# Patient Record
Sex: MALE | Race: WHITE | NOT HISPANIC OR LATINO | Employment: OTHER | URBAN - NONMETROPOLITAN AREA
[De-identification: names, ages, dates, MRNs, and addresses within clinical notes are randomized per-mention and may not be internally consistent; named-entity substitution may affect disease eponyms.]

---

## 2024-07-31 ENCOUNTER — APPOINTMENT (OUTPATIENT)
Dept: PRIMARY CARE | Facility: CLINIC | Age: 79
End: 2024-07-31
Payer: MEDICARE

## 2024-07-31 VITALS
WEIGHT: 273 LBS | HEART RATE: 60 BPM | BODY MASS INDEX: 33.94 KG/M2 | HEIGHT: 75 IN | SYSTOLIC BLOOD PRESSURE: 112 MMHG | DIASTOLIC BLOOD PRESSURE: 73 MMHG

## 2024-07-31 DIAGNOSIS — I89.0 LYMPHEDEMA: ICD-10-CM

## 2024-07-31 DIAGNOSIS — G62.9 NEUROPATHY: ICD-10-CM

## 2024-07-31 DIAGNOSIS — I10 PRIMARY HYPERTENSION: ICD-10-CM

## 2024-07-31 DIAGNOSIS — E07.9 DISEASE OF THYROID GLAND: ICD-10-CM

## 2024-07-31 DIAGNOSIS — Z12.5 SCREENING FOR PROSTATE CANCER: ICD-10-CM

## 2024-07-31 DIAGNOSIS — I48.21 PERMANENT ATRIAL FIBRILLATION (MULTI): ICD-10-CM

## 2024-07-31 DIAGNOSIS — M06.0A RHEUMATOID ARTHRITIS OF OTHER SITE WITH NEGATIVE RHEUMATOID FACTOR (MULTI): ICD-10-CM

## 2024-07-31 DIAGNOSIS — E89.0 POSTOPERATIVE HYPOTHYROIDISM: ICD-10-CM

## 2024-07-31 DIAGNOSIS — K40.90 NON-RECURRENT UNILATERAL INGUINAL HERNIA WITHOUT OBSTRUCTION OR GANGRENE: Primary | ICD-10-CM

## 2024-07-31 PROCEDURE — 1158F ADVNC CARE PLAN TLK DOCD: CPT | Performed by: INTERNAL MEDICINE

## 2024-07-31 PROCEDURE — 99204 OFFICE O/P NEW MOD 45 MIN: CPT | Performed by: INTERNAL MEDICINE

## 2024-07-31 PROCEDURE — 1036F TOBACCO NON-USER: CPT | Performed by: INTERNAL MEDICINE

## 2024-07-31 PROCEDURE — 1159F MED LIST DOCD IN RCRD: CPT | Performed by: INTERNAL MEDICINE

## 2024-07-31 PROCEDURE — 3078F DIAST BP <80 MM HG: CPT | Performed by: INTERNAL MEDICINE

## 2024-07-31 PROCEDURE — 1160F RVW MEDS BY RX/DR IN RCRD: CPT | Performed by: INTERNAL MEDICINE

## 2024-07-31 PROCEDURE — 1123F ACP DISCUSS/DSCN MKR DOCD: CPT | Performed by: INTERNAL MEDICINE

## 2024-07-31 PROCEDURE — 3074F SYST BP LT 130 MM HG: CPT | Performed by: INTERNAL MEDICINE

## 2024-07-31 RX ORDER — LOSARTAN POTASSIUM 25 MG/1
1 TABLET ORAL DAILY
COMMUNITY
End: 2024-07-31 | Stop reason: SDUPTHER

## 2024-07-31 RX ORDER — DEXTROMETHORPHAN HYDROBROMIDE, GUAIFENESIN 5; 100 MG/5ML; MG/5ML
650 LIQUID ORAL EVERY 8 HOURS PRN
COMMUNITY

## 2024-07-31 RX ORDER — METOPROLOL SUCCINATE 25 MG/1
25 TABLET, EXTENDED RELEASE ORAL DAILY
COMMUNITY

## 2024-07-31 RX ORDER — AMIODARONE HYDROCHLORIDE 200 MG/1
1 TABLET ORAL DAILY
COMMUNITY

## 2024-07-31 RX ORDER — GABAPENTIN 100 MG/1
100 CAPSULE ORAL 3 TIMES DAILY
Qty: 90 CAPSULE | Refills: 3 | Status: SHIPPED | OUTPATIENT
Start: 2024-07-31

## 2024-07-31 RX ORDER — ACETAMINOPHEN 650 MG/1
SUPPOSITORY RECTAL
COMMUNITY

## 2024-07-31 RX ORDER — LEVOTHYROXINE SODIUM 75 UG/1
1 TABLET ORAL DAILY
COMMUNITY
End: 2024-07-31 | Stop reason: SDUPTHER

## 2024-07-31 RX ORDER — CAYENNE 450 MG
CAPSULE ORAL
COMMUNITY

## 2024-07-31 RX ORDER — NAPROXEN SODIUM 220 MG
220 TABLET ORAL
COMMUNITY

## 2024-07-31 RX ORDER — LOSARTAN POTASSIUM 25 MG/1
25 TABLET ORAL DAILY
Qty: 90 TABLET | Refills: 3 | Status: SHIPPED | OUTPATIENT
Start: 2024-07-31

## 2024-07-31 RX ORDER — FUROSEMIDE 20 MG/1
1 TABLET ORAL DAILY
COMMUNITY

## 2024-07-31 RX ORDER — GABAPENTIN 100 MG/1
1 CAPSULE ORAL 3 TIMES DAILY
COMMUNITY
End: 2024-07-31 | Stop reason: SDUPTHER

## 2024-07-31 RX ORDER — LEVOTHYROXINE SODIUM 75 UG/1
75 TABLET ORAL DAILY
Qty: 90 TABLET | Refills: 3 | Status: SHIPPED | OUTPATIENT
Start: 2024-07-31

## 2024-07-31 RX ORDER — RIVAROXABAN 20 MG/1
1 TABLET, FILM COATED ORAL DAILY
COMMUNITY

## 2024-07-31 ASSESSMENT — ENCOUNTER SYMPTOMS
WHEEZING: 0
SHORTNESS OF BREATH: 0
SINUS PRESSURE: 0
BACK PAIN: 0
APPETITE CHANGE: 0
ARTHRALGIAS: 1
ACTIVITY CHANGE: 0
WEAKNESS: 0
NUMBNESS: 0
DIARRHEA: 0
VOMITING: 0
FATIGUE: 0
ABDOMINAL DISTENTION: 0
CHILLS: 0
NAUSEA: 0
COUGH: 0
SINUS PAIN: 0
SORE THROAT: 0

## 2024-07-31 ASSESSMENT — PATIENT HEALTH QUESTIONNAIRE - PHQ9
SUM OF ALL RESPONSES TO PHQ9 QUESTIONS 1 AND 2: 0
1. LITTLE INTEREST OR PLEASURE IN DOING THINGS: NOT AT ALL
2. FEELING DOWN, DEPRESSED OR HOPELESS: NOT AT ALL

## 2024-07-31 NOTE — PROGRESS NOTES
"Subjective   Patient ID: Avelino Luna is a 78 y.o. male who presents for Establish Care (NP/EST CARE).  HPI  Patient is 78 y.o. male patient who is here today to establish care.     Pt has a pmhx of a fib,  hypothyroidism, RA affecting joints on right side of his body, OA. Multiple relatives with AAA aneurysms.     Review of Systems   Constitutional:  Negative for activity change, appetite change, chills and fatigue.   HENT:  Negative for congestion, postnasal drip, sinus pressure, sinus pain and sore throat.    Respiratory:  Negative for cough, shortness of breath and wheezing.    Cardiovascular:  Negative for chest pain and leg swelling.   Gastrointestinal:  Negative for abdominal distention, diarrhea, nausea and vomiting.   Musculoskeletal:  Positive for arthralgias. Negative for back pain.   Neurological:  Negative for weakness and numbness.       Objective   /73   Pulse 60   Ht 1.905 m (6' 3\")   Wt 124 kg (273 lb)   BMI 34.12 kg/m²     Physical Exam  Constitutional:       General: He is not in acute distress.     Appearance: Normal appearance.   HENT:      Head: Normocephalic.      Right Ear: Tympanic membrane, ear canal and external ear normal.      Left Ear: Tympanic membrane, ear canal and external ear normal.      Nose: Nose normal.      Mouth/Throat:      Pharynx: No oropharyngeal exudate.   Eyes:      General:         Right eye: No discharge.         Left eye: No discharge.      Extraocular Movements: Extraocular movements intact.      Pupils: Pupils are equal, round, and reactive to light.   Cardiovascular:      Rate and Rhythm: Normal rate and regular rhythm.      Heart sounds: No murmur heard.     No gallop.   Pulmonary:      Effort: Pulmonary effort is normal. No respiratory distress.      Breath sounds: Normal breath sounds. No wheezing.   Abdominal:      General: Bowel sounds are normal. There is no distension.      Palpations: Abdomen is soft.      Tenderness: There is no abdominal " tenderness.      Hernia: A hernia (left inguinal) is present.   Musculoskeletal:         General: No swelling. Normal range of motion.      Cervical back: Neck supple. No tenderness.   Skin:     General: Skin is warm and dry.      Coloration: Skin is not jaundiced.   Neurological:      General: No focal deficit present.      Mental Status: He is alert and oriented to person, place, and time.      Cranial Nerves: No cranial nerve deficit.   Psychiatric:         Mood and Affect: Mood normal.         Behavior: Behavior normal.           Assessment/Plan   Problem List Items Addressed This Visit       A-fib (Multi)    Relevant Medications    metoprolol succinate XL (Toprol-XL) 25 mg 24 hr tablet    Hypertension    Relevant Medications    losartan (Cozaar) 25 mg tablet    Other Relevant Orders    Lipid Panel    Comprehensive Metabolic Panel    Hemoglobin A1C    Disease of thyroid gland    RA (rheumatoid arthritis) (Multi)    Lymphedema     Other Visit Diagnoses       Non-recurrent unilateral inguinal hernia without obstruction or gangrene    -  Primary    Relevant Orders    Referral to General Surgery    Postoperative hypothyroidism        Relevant Medications    levothyroxine (Synthroid, Levoxyl) 75 mcg tablet    Neuropathy        Relevant Medications    gabapentin (Neurontin) 100 mg capsule    Screening for prostate cancer        Relevant Orders    Prostate Spec.Ag,Screen        Immunizations   Flu shot 2023  COVID received   PNA received   Shingles recommende   RSV recommended     Colon cancer screening 2016, normal   PSA will order     A fib, HTN , lymphedema   - continue amiodarone 200mg po daily   - continue lasix 20mg po dialy   -continue losartan 25mg po daily   - continue metoprolol 25mg po daily   - uses lymphedema pump nightly     2. Hypothyroidism   - check tsh   - continue synthroid     3. Hx of dvt   - was about 11 mo after knee replacement   - on xarelto     4. 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     5. Will obtain previous records   Final diagnoses:   [K40.90] Non-recurrent unilateral inguinal hernia without obstruction or gangrene   [E89.0] Postoperative hypothyroidism   [I10] Primary hypertension   [G62.9] Neuropathy   [Z12.5] Screening for prostate cancer   [I48.21] Permanent atrial fibrillation (Multi)   [E07.9] Disease of thyroid gland   [M06.0A] Rheumatoid arthritis of other site with negative rheumatoid factor (Multi)   [I89.0] Lymphedema

## 2024-08-01 ENCOUNTER — TELEPHONE (OUTPATIENT)
Dept: PRIMARY CARE | Facility: CLINIC | Age: 79
End: 2024-08-01
Payer: MEDICARE

## 2024-08-01 NOTE — TELEPHONE ENCOUNTER
Pt came in concerned that he has high blood sugar on his after visit summary. Pt said he has never had high sugar but his wife has. He asked if it could be taken off.

## 2024-08-06 ENCOUNTER — APPOINTMENT (OUTPATIENT)
Dept: SURGERY | Facility: CLINIC | Age: 79
End: 2024-08-06
Payer: MEDICARE

## 2024-08-08 ENCOUNTER — LAB (OUTPATIENT)
Dept: LAB | Facility: LAB | Age: 79
End: 2024-08-08
Payer: MEDICARE

## 2024-08-08 ENCOUNTER — OFFICE VISIT (OUTPATIENT)
Dept: SURGERY | Facility: CLINIC | Age: 79
End: 2024-08-08
Payer: MEDICARE

## 2024-08-08 VITALS
BODY MASS INDEX: 33.82 KG/M2 | SYSTOLIC BLOOD PRESSURE: 118 MMHG | HEIGHT: 75 IN | DIASTOLIC BLOOD PRESSURE: 76 MMHG | WEIGHT: 272 LBS | HEART RATE: 55 BPM

## 2024-08-08 DIAGNOSIS — R10.32 LEFT GROIN PAIN: ICD-10-CM

## 2024-08-08 DIAGNOSIS — R10.32 LEFT GROIN PAIN: Primary | ICD-10-CM

## 2024-08-08 DIAGNOSIS — K40.90 NON-RECURRENT UNILATERAL INGUINAL HERNIA WITHOUT OBSTRUCTION OR GANGRENE: ICD-10-CM

## 2024-08-08 LAB
ANION GAP SERPL CALC-SCNC: 11 MMOL/L (ref 10–20)
BUN SERPL-MCNC: 23 MG/DL (ref 6–23)
CALCIUM SERPL-MCNC: 9 MG/DL (ref 8.6–10.3)
CHLORIDE SERPL-SCNC: 105 MMOL/L (ref 98–107)
CO2 SERPL-SCNC: 27 MMOL/L (ref 21–32)
CREAT SERPL-MCNC: 1.49 MG/DL (ref 0.5–1.3)
EGFRCR SERPLBLD CKD-EPI 2021: 48 ML/MIN/1.73M*2
GLUCOSE SERPL-MCNC: 104 MG/DL (ref 74–99)
POTASSIUM SERPL-SCNC: 4.8 MMOL/L (ref 3.5–5.3)
SODIUM SERPL-SCNC: 138 MMOL/L (ref 136–145)

## 2024-08-08 PROCEDURE — 1036F TOBACCO NON-USER: CPT | Performed by: SURGERY

## 2024-08-08 PROCEDURE — 1123F ACP DISCUSS/DSCN MKR DOCD: CPT | Performed by: SURGERY

## 2024-08-08 PROCEDURE — 1159F MED LIST DOCD IN RCRD: CPT | Performed by: SURGERY

## 2024-08-08 PROCEDURE — 3078F DIAST BP <80 MM HG: CPT | Performed by: SURGERY

## 2024-08-08 PROCEDURE — 3074F SYST BP LT 130 MM HG: CPT | Performed by: SURGERY

## 2024-08-08 PROCEDURE — 99203 OFFICE O/P NEW LOW 30 MIN: CPT | Performed by: SURGERY

## 2024-08-08 PROCEDURE — 80048 BASIC METABOLIC PNL TOTAL CA: CPT

## 2024-08-08 PROCEDURE — 36415 COLL VENOUS BLD VENIPUNCTURE: CPT

## 2024-08-08 NOTE — PROGRESS NOTES
General Surgery Consultation    Patient: Avelino Luna  : 1945  MRN: [id number]  Date of Consultation: 24    Referring Primary Care Provider: Ladonna Zepeda DO    Chief Complaint: Left groin pain    History of Present Illness: Avelino Luna is a 78 y.o. old male seen at the request of Dr. Zepeda for evaluation left groin pain.  He recently moved to [place] from [place] about a month ago.  In the process of moving, while lifting he had a sharp pain in the left groin.  This intermittently continues to give him discomfort.  It gives him a sharp pain particularly when swinging his legs to get out of bed.  He denies any visible bulge in the left groin.  He denies any symptoms on the right.  He denies any discomfort if he coughs or sneezes.  However, he does have some increased pain in that left groin if he strains with a bowel movement.  His only previous abdominal surgery is a laparoscopic cholecystectomy.  He had a right orchiectomy as a teenager for severe staph infection. He takes Xarelto for history of a right lower extremity DVT that occurred several months following a right knee replacement.  He states that he has held this in the past for previous procedures including his cholecystectomy.  His BMI is 34.  He is not a smoker.  Of note, his father had colon cancer.  His last colonoscopy was in 2016.    Medical History:  Left groin pain  History of DVT, 2016  Atrial fibrillation  Hypertension  Aortic aneurysm  Hypothyroidism  Rheumatoid arthritis  Lymphedema  Cervical spondyloarthritis  Neuropathy  Plantar fasciitis  Obesity, BMI 34    Surgical History:  Cholecystectomy  Colonoscopy, 10/16/2016 in [facility]  Carpal tunnel release  Bilateral total knee arthroplasty  Cataract extraction    Home Medications:  Prior to Admission medications    Medication Sig Start Date End Date Taking? Authorizing Provider   acetaminophen (Tylenol 8 HOUR) 650 mg ER tablet Take 1 tablet (650 mg) by mouth  every 8 hours if needed for mild pain (1 - 3). Do not crush, chew, or split.    Historical Provider, MD   acetaminophen (Tylenol) 650 mg suppository Insert into the rectum.    Historical Provider, MD   amiodarone (Pacerone) 200 mg tablet Take 1 tablet (200 mg) by mouth once daily.    Historical Provider, MD   capsicum, cayenne, 450 mg capsule Take by mouth.    Historical Provider, MD   collagen/biotin/ascorbic acid (COLLAGEN 1500 PLUS C ORAL) Take by mouth.    Historical Provider, MD   furosemide (Lasix) 20 mg tablet Take 1 tablet (20 mg) by mouth once daily.    Historical Provider, MD   gabapentin (Neurontin) 100 mg capsule Take 1 capsule (100 mg) by mouth 3 times a day. 7/31/24   Ladonna Zepeda DO   levothyroxine (Synthroid, Levoxyl) 75 mcg tablet Take 1 tablet (75 mcg) by mouth once daily. 7/31/24   Ladonna Zepeda DO   losartan (Cozaar) 25 mg tablet Take 1 tablet (25 mg) by mouth once daily. 7/31/24   Ladonna Zepeda DO   metoprolol succinate XL (Toprol-XL) 25 mg 24 hr tablet Take 1 tablet (25 mg) by mouth once daily.    Historical Provider, MD   multivit-min/folic/vit K/lycop (ONE DAILY MEN'S 50 PLUS W-D3 ORAL) Take by mouth.    Historical Provider, MD   naproxen sodium (Aleve) 220 mg tablet Take 1 tablet (220 mg) by mouth 2 times daily (morning and late afternoon).    Historical Provider, MD   turm/ging/liza/yuc/kojo/bijal/hor (TUMERSAID ORAL) Take by mouth.    Historical Provider, MD   Xarelto 20 mg tablet Take 1 tablet (20 mg) by mouth once daily.    Historical Provider, MD     Allergies:  Aspirin, Mold, NSAIDs, Ragweed    Family History:   Mother with breast cancer.  Father with colon cancer, diagnosed at age 93.  Paternal grandfather with prostate cancer.    Social History:  Non-smoker.  Alcohol use.  No drug use.  .    ROS:  Constitutional: + History of skin cancer  Cardiovascular: + Irregular heartbeat  Respiratory: No cough or shortness of breath  Gastrointestinal: + Left groin pain,  "occasional constipation  Genitourinary: + Frequent urination, history of kidney stones  Musculoskeletal: + Neck pain, arthritis  Integumentary: no rashes  Neurological: no confusion  Endocrine: no heat or cold intolerance  Heme/Lymph: + Easy bruising, on Xarelto for history of DVT    Objective:  /76   Pulse 55   Ht 1.905 m (6' 3\")   Wt 123 kg (272 lb)   BMI 34.00 kg/m²     Physical Exam:  Constitutional: No acute distress, conversant, pleasant  Neurologic: alert and oriented  Psych: appropriate affect  Ears, Nose, Mouth and Throat: mucus membranes moist  Pulmonary: No labored breathing  Cardiovascular: Regular rate and rhythm  Abdomen: soft, non-distended, nontender, port site scars in the upper abdomen consistent with previous laparoscopic cholecystectomy, BMI 34, excess suprapubic fat bilaterally but no asymmetry between the left and right side/no visible bulge in the left groin, he is tender on palpation in the left groin but I do not definitively feel a hernia in this area and no impulse when coughing.  Genitourinary: Right orchiectomy, left testicle without palpable mass  Musculoskeletal: Moves all extremities, no edema  Skin: no jaundice    Labs/Imaging:   No pertinent labs or imaging available for review.    Assessment and Plan: Avelino Luna is a 78 y.o. old male with left groin pain.  I do not appreciate an obvious hernia on physical exam, although he does have excess fat in this area which can somewhat limit the examination.  I think it is possible that this is just a pulled muscle, but the location of his pain is consistent with a hernia.  Therefore, we will obtain a CT scan for further evaluation.  I will see him back to discuss the results of the CT scan.  At that time, if a hernia is present we can discuss operative repair versus an observational approach.  His history of a orchiectomy on the contralateral side would weigh into the discussion of risks.  If either no hernia is present, or he " does not want hernia repair, we can also discuss getting him up-to-date on screening colonoscopy.    Ladonna Lopez MD  8/8/2024

## 2024-08-08 NOTE — LETTER
2024     Ladonna Zepeda DO  [address]  [facility] [address]    Patient: Avelino Luna   YOB: 1945   Date of Visit: 2024       Dear Dr. Ladonna Zepeda DO:    Thank you for referring Avelino Luna to me for evaluation. Below are my notes for this consultation.  If you have questions, please do not hesitate to call me. I look forward to following your patient along with you.       Sincerely,     Ladonna Lopez MD      CC: No Recipients  ______________________________________________________________________________________    General Surgery Consultation    Patient: Avelino Luna  : 1945  MRN: [id number]  Date of Consultation: 24    Referring Primary Care Provider: Ladonna Zepeda DO    Chief Complaint: Left groin pain    History of Present Illness: Avelino Luna is a 78 y.o. old male seen at the request of Dr. Zepeda for evaluation left groin pain.  He recently moved to [place] from [place] about a month ago.  In the process of moving, while lifting he had a sharp pain in the left groin.  This intermittently continues to give him discomfort.  It gives him a sharp pain particularly when swinging his legs to get out of bed.  He denies any visible bulge in the left groin.  He denies any symptoms on the right.  He denies any discomfort if he coughs or sneezes.  However, he does have some increased pain in that left groin if he strains with a bowel movement.  His only previous abdominal surgery is a laparoscopic cholecystectomy.  He had a right orchiectomy as a teenager for severe staph infection. He takes Xarelto for history of a right lower extremity DVT that occurred several months following a right knee replacement.  He states that he has held this in the past for previous procedures including his cholecystectomy.  His BMI is 34.  He is not a smoker.  Of note, his father had colon cancer.  His last colonoscopy was in  2016.    Medical History:  Left groin pain  History of DVT, 2016  Atrial fibrillation  Hypertension  Aortic aneurysm  Hypothyroidism  Rheumatoid arthritis  Lymphedema  Cervical spondyloarthritis  Neuropathy  Plantar fasciitis  Obesity, BMI 34    Surgical History:  Cholecystectomy  Colonoscopy, 10/16/2016 in [facility]  Carpal tunnel release  Bilateral total knee arthroplasty  Cataract extraction    Home Medications:  Prior to Admission medications    Medication Sig Start Date End Date Taking? Authorizing Provider   acetaminophen (Tylenol 8 HOUR) 650 mg ER tablet Take 1 tablet (650 mg) by mouth every 8 hours if needed for mild pain (1 - 3). Do not crush, chew, or split.    Historical Provider, MD   acetaminophen (Tylenol) 650 mg suppository Insert into the rectum.    Historical Provider, MD   amiodarone (Pacerone) 200 mg tablet Take 1 tablet (200 mg) by mouth once daily.    Historical Provider, MD   capsicum, cayenne, 450 mg capsule Take by mouth.    Historical Provider, MD   collagen/biotin/ascorbic acid (COLLAGEN 1500 PLUS C ORAL) Take by mouth.    Historical Provider, MD   furosemide (Lasix) 20 mg tablet Take 1 tablet (20 mg) by mouth once daily.    Historical Provider, MD   gabapentin (Neurontin) 100 mg capsule Take 1 capsule (100 mg) by mouth 3 times a day. 7/31/24   Ladonna Zepeda DO   levothyroxine (Synthroid, Levoxyl) 75 mcg tablet Take 1 tablet (75 mcg) by mouth once daily. 7/31/24   Ladonna Zepeda DO   losartan (Cozaar) 25 mg tablet Take 1 tablet (25 mg) by mouth once daily. 7/31/24   Ladonna Zepeda DO   metoprolol succinate XL (Toprol-XL) 25 mg 24 hr tablet Take 1 tablet (25 mg) by mouth once daily.    Historical Provider, MD   multivit-min/folic/vit K/lycop (ONE DAILY MEN'S 50 PLUS W-D3 ORAL) Take by mouth.    Historical Provider, MD   naproxen sodium (Aleve) 220 mg tablet Take 1 tablet (220 mg) by mouth 2 times daily (morning and late afternoon).    Historical Provider, MD  "  turm/ging/liza/yuc/kojo/bijal/hor (TUMERSAID ORAL) Take by mouth.    Historical Provider, MD   Xarelto 20 mg tablet Take 1 tablet (20 mg) by mouth once daily.    Historical Provider, MD     Allergies:  Aspirin, Mold, NSAIDs, Ragweed    Family History:   Mother with breast cancer.  Father with colon cancer, diagnosed at age 93.  Paternal grandfather with prostate cancer.    Social History:  Non-smoker.  Alcohol use.  No drug use.  .    ROS:  Constitutional: + History of skin cancer  Cardiovascular: + Irregular heartbeat  Respiratory: No cough or shortness of breath  Gastrointestinal: + Left groin pain, occasional constipation  Genitourinary: + Frequent urination, history of kidney stones  Musculoskeletal: + Neck pain, arthritis  Integumentary: no rashes  Neurological: no confusion  Endocrine: no heat or cold intolerance  Heme/Lymph: + Easy bruising, on Xarelto for history of DVT    Objective:  /76   Pulse 55   Ht 1.905 m (6' 3\")   Wt 123 kg (272 lb)   BMI 34.00 kg/m²     Physical Exam:  Constitutional: No acute distress, conversant, pleasant  Neurologic: alert and oriented  Psych: appropriate affect  Ears, Nose, Mouth and Throat: mucus membranes moist  Pulmonary: No labored breathing  Cardiovascular: Regular rate and rhythm  Abdomen: soft, non-distended, nontender, port site scars in the upper abdomen consistent with previous laparoscopic cholecystectomy, BMI 34, excess suprapubic fat bilaterally but no asymmetry between the left and right side/no visible bulge in the left groin, he is tender on palpation in the left groin but I do not definitively feel a hernia in this area and no impulse when coughing.  Genitourinary: Right orchiectomy, left testicle without palpable mass  Musculoskeletal: Moves all extremities, no edema  Skin: no jaundice    Labs/Imaging:   No pertinent labs or imaging available for review.    Assessment and Plan: Avelino Luna is a 78 y.o. old male with left groin pain.  I do " not appreciate an obvious hernia on physical exam, although he does have excess fat in this area which can somewhat limit the examination.  I think it is possible that this is just a pulled muscle, but the location of his pain is consistent with a hernia.  Therefore, we will obtain a CT scan for further evaluation.  I will see him back to discuss the results of the CT scan.  At that time, if a hernia is present we can discuss operative repair versus an observational approach.  His history of a orchiectomy on the contralateral side would weigh into the discussion of risks.  If either no hernia is present, or he does not want hernia repair, we can also discuss getting him up-to-date on screening colonoscopy.    Ladonna Lopez MD  8/8/2024

## 2024-08-12 DIAGNOSIS — I48.21 PERMANENT ATRIAL FIBRILLATION (MULTI): Primary | ICD-10-CM

## 2024-08-12 RX ORDER — AMIODARONE HYDROCHLORIDE 200 MG/1
200 TABLET ORAL DAILY
Qty: 90 TABLET | Refills: 3 | Status: SHIPPED | OUTPATIENT
Start: 2024-08-12

## 2024-08-15 ENCOUNTER — HOSPITAL ENCOUNTER (OUTPATIENT)
Dept: RADIOLOGY | Facility: HOSPITAL | Age: 79
Discharge: HOME | End: 2024-08-15
Payer: MEDICARE

## 2024-08-15 DIAGNOSIS — R10.32 LEFT GROIN PAIN: ICD-10-CM

## 2024-08-15 PROCEDURE — 74177 CT ABD & PELVIS W/CONTRAST: CPT

## 2024-08-15 PROCEDURE — A9698 NON-RAD CONTRAST MATERIALNOC: HCPCS | Performed by: SURGERY

## 2024-08-15 PROCEDURE — 2550000001 HC RX 255 CONTRASTS: Performed by: SURGERY

## 2024-08-22 ENCOUNTER — APPOINTMENT (OUTPATIENT)
Dept: SURGERY | Facility: CLINIC | Age: 79
End: 2024-08-22
Payer: MEDICARE

## 2024-08-22 ENCOUNTER — PREP FOR PROCEDURE (OUTPATIENT)
Dept: SURGERY | Facility: HOSPITAL | Age: 79
End: 2024-08-22

## 2024-08-22 VITALS
HEART RATE: 55 BPM | HEIGHT: 75 IN | WEIGHT: 272 LBS | SYSTOLIC BLOOD PRESSURE: 124 MMHG | DIASTOLIC BLOOD PRESSURE: 76 MMHG | BODY MASS INDEX: 33.82 KG/M2

## 2024-08-22 DIAGNOSIS — R10.32 LEFT GROIN PAIN: Primary | ICD-10-CM

## 2024-08-22 DIAGNOSIS — Z12.11 COLON CANCER SCREENING: Primary | ICD-10-CM

## 2024-08-22 PROCEDURE — 99213 OFFICE O/P EST LOW 20 MIN: CPT | Performed by: SURGERY

## 2024-08-22 PROCEDURE — 1159F MED LIST DOCD IN RCRD: CPT | Performed by: SURGERY

## 2024-08-22 PROCEDURE — 3078F DIAST BP <80 MM HG: CPT | Performed by: SURGERY

## 2024-08-22 PROCEDURE — 3074F SYST BP LT 130 MM HG: CPT | Performed by: SURGERY

## 2024-08-22 PROCEDURE — 1123F ACP DISCUSS/DSCN MKR DOCD: CPT | Performed by: SURGERY

## 2024-08-22 PROCEDURE — 1036F TOBACCO NON-USER: CPT | Performed by: SURGERY

## 2024-08-22 RX ORDER — ONDANSETRON HYDROCHLORIDE 2 MG/ML
4 INJECTION, SOLUTION INTRAVENOUS ONCE AS NEEDED
OUTPATIENT
Start: 2024-08-22

## 2024-08-22 RX ORDER — SODIUM CHLORIDE, SODIUM LACTATE, POTASSIUM CHLORIDE, CALCIUM CHLORIDE 600; 310; 30; 20 MG/100ML; MG/100ML; MG/100ML; MG/100ML
20 INJECTION, SOLUTION INTRAVENOUS CONTINUOUS
OUTPATIENT
Start: 2024-08-22

## 2024-08-22 NOTE — PROGRESS NOTES
General Surgery Consultation    Patient: Avelino Luna  : 1945  MRN: [id number]  Date of Consultation: 24    Primary Care Provider: Ladonna Zepeda DO    Chief Complaint: Left groin pain     History of Present Illness: Avelino Luna is a 78 y.o. old male who I previously evaluated on 2024 for left groin pain.  He was not seeing a visible bulge in the groin.  Certain movements elicited a sharp pain in the left groin.  This began shortly after moving to [place] from [place] and lifting a lot of boxes and that process.  I did not appreciate an inguinal hernia on physical exam.  We obtained a CT scan and this did not show the presence of a hernia.  He continues to have pain in the groin.  He mostly notices it when stretching prior to golfing, and when getting out of a low-seated chair.  He is also due for screening colonoscopy, which is why he brought him back to the office rather than just relaying CT results over the phone.  His father had a history of colon cancer.  His last colonoscopy was in 2016.     Medical History:  Left groin pain  History of DVT, 2016  Atrial fibrillation  Hypertension  Aortic aneurysm  Hypothyroidism  Rheumatoid arthritis  Lymphedema  Cervical spondyloarthritis  Neuropathy  Plantar fasciitis  Obesity, BMI 34     Surgical History:  Cholecystectomy  Colonoscopy, 10/16/2016 in [facility]  Carpal tunnel release  Bilateral total knee arthroplasty  Cataract extraction      Home Medications:  Prior to Admission medications    Medication Sig Start Date End Date Taking? Authorizing Provider   acetaminophen (Tylenol 8 HOUR) 650 mg ER tablet Take 1 tablet (650 mg) by mouth every 8 hours if needed for mild pain (1 - 3). Do not crush, chew, or split.    Historical Provider, MD   acetaminophen (Tylenol) 650 mg suppository Insert into the rectum.    Historical Provider, MD   amiodarone (Pacerone) 200 mg tablet Take 1 tablet (200 mg) by mouth once daily. 24   Ladonna MUÑIZ  DO Katelyn   capsicum, cayenne, 450 mg capsule Take by mouth.    Historical Provider, MD   collagen/biotin/ascorbic acid (COLLAGEN 1500 PLUS C ORAL) Take by mouth.    Historical Provider, MD   furosemide (Lasix) 20 mg tablet Take 1 tablet (20 mg) by mouth once daily.    Historical Provider, MD   gabapentin (Neurontin) 100 mg capsule Take 1 capsule (100 mg) by mouth 3 times a day. 7/31/24   Ladonna Zepeda DO   levothyroxine (Synthroid, Levoxyl) 75 mcg tablet Take 1 tablet (75 mcg) by mouth once daily. 7/31/24   Ladonna Zepeda DO   losartan (Cozaar) 25 mg tablet Take 1 tablet (25 mg) by mouth once daily. 7/31/24   Ladonna Zepeda DO   metoprolol succinate XL (Toprol-XL) 25 mg 24 hr tablet Take 1 tablet (25 mg) by mouth once daily.    Historical Provider, MD   multivit-min/folic/vit K/lycop (ONE DAILY MEN'S 50 PLUS W-D3 ORAL) Take by mouth.    Historical Provider, MD   naproxen sodium (Aleve) 220 mg tablet Take 1 tablet (220 mg) by mouth 2 times daily (morning and late afternoon).    Historical Provider, MD   turm/ging/liza/yuc/kojo/bijal/hor (TUMERSAID ORAL) Take by mouth.    Historical Provider, MD   Xarelto 20 mg tablet Take 1 tablet (20 mg) by mouth once daily.    Historical Provider, MD     Allergies:  Aspirin, Mold, NSAIDs, Ragweed     Family History:   Mother with breast cancer.  Father with colon cancer, diagnosed at age 93.  Paternal grandfather with prostate cancer.     Social History:  Non-smoker.  Alcohol use.  No drug use.  .     ROS:  Constitutional: + History of skin cancer  Cardiovascular: + Irregular heartbeat  Respiratory: No cough or shortness of breath  Gastrointestinal: + Left groin pain, occasional constipation  Genitourinary: + Frequent urination, history of kidney stones  Musculoskeletal: + Neck pain, arthritis  Integumentary: no rashes  Neurological: no confusion  Endocrine: no heat or cold intolerance  Heme/Lymph: + Easy bruising, on Xarelto for history of  "DVT    Objective:  /76   Pulse 55   Ht 1.905 m (6' 3\")   Wt 123 kg (272 lb)   BMI 34.00 kg/m²     Physical Exam:  Constitutional: No acute distress, conversant, pleasant  Neurologic: alert and oriented  Psych: appropriate affect  Ears, Nose, Mouth and Throat: mucus membranes moist  Pulmonary: No labored breathing  Cardiovascular: Regular rate and rhythm  Abdomen: Nondistended, BMI 34, nontender at epigastric hernia site  Musculoskeletal: Moves all extremities, no edema  Skin: no jaundice     Labs/Imaging:   CT abdomen and pelvis from 8/15/24 reviewed: Epigastric hernia measuring 2.4 cm.  No inguinal hernia or groin mass.     Assessment and Plan: Avelino Luna is a 78 y.o. old male with left groin pain.  I did not appreciate a hernia on physical exam, and CT scan did not show the presence of a hernia either.  It is possible that this was just musculoskeletal pain in the groin that he developed in the moving process while doing activity that he does not normally do.  If anything changes, I would be happy to reevaluate.  His last colonoscopy was in 2016.  His father had colon cancer.  We therefore discussed getting him up-to-date on screening colonoscopy as he is establishing care in the area.  We discussed the risks of this procedure.  This included risks of bleeding, perforation, missed polyps, incomplete colonoscopy, and potential need for additional procedures pending findings.  He was agreeable to proceed.  Bowel prep instructions were reviewed and all questions were answered.  He is scheduled for colonoscopy on 9/9/24.    Ladonna Lopez MD  8/22/2024    "

## 2024-08-22 NOTE — H&P (VIEW-ONLY)
General Surgery Consultation    Patient: Avelino Luna  : 1945  MRN: [id number]  Date of Consultation: 24    Primary Care Provider: Ladonna Zepeda DO    Chief Complaint: Left groin pain     History of Present Illness: Avelino Luna is a 78 y.o. old male who I previously evaluated on 2024 for left groin pain.  He was not seeing a visible bulge in the groin.  Certain movements elicited a sharp pain in the left groin.  This began shortly after moving to [place] from [place] and lifting a lot of boxes and that process.  I did not appreciate an inguinal hernia on physical exam.  We obtained a CT scan and this did not show the presence of a hernia.  He continues to have pain in the groin.  He mostly notices it when stretching prior to golfing, and when getting out of a low-seated chair.  He is also due for screening colonoscopy, which is why he brought him back to the office rather than just relaying CT results over the phone.  His father had a history of colon cancer.  His last colonoscopy was in 2016.     Medical History:  Left groin pain  History of DVT, 2016  Atrial fibrillation  Hypertension  Aortic aneurysm  Hypothyroidism  Rheumatoid arthritis  Lymphedema  Cervical spondyloarthritis  Neuropathy  Plantar fasciitis  Obesity, BMI 34     Surgical History:  Cholecystectomy  Colonoscopy, 10/16/2016 in [facility]  Carpal tunnel release  Bilateral total knee arthroplasty  Cataract extraction      Home Medications:  Prior to Admission medications    Medication Sig Start Date End Date Taking? Authorizing Provider   acetaminophen (Tylenol 8 HOUR) 650 mg ER tablet Take 1 tablet (650 mg) by mouth every 8 hours if needed for mild pain (1 - 3). Do not crush, chew, or split.    Historical Provider, MD   acetaminophen (Tylenol) 650 mg suppository Insert into the rectum.    Historical Provider, MD   amiodarone (Pacerone) 200 mg tablet Take 1 tablet (200 mg) by mouth once daily. 24   Ladonna MUÑIZ  DO Katelyn   capsicum, cayenne, 450 mg capsule Take by mouth.    Historical Provider, MD   collagen/biotin/ascorbic acid (COLLAGEN 1500 PLUS C ORAL) Take by mouth.    Historical Provider, MD   furosemide (Lasix) 20 mg tablet Take 1 tablet (20 mg) by mouth once daily.    Historical Provider, MD   gabapentin (Neurontin) 100 mg capsule Take 1 capsule (100 mg) by mouth 3 times a day. 7/31/24   Ladonna Zepeda DO   levothyroxine (Synthroid, Levoxyl) 75 mcg tablet Take 1 tablet (75 mcg) by mouth once daily. 7/31/24   Ladonna Zepeda DO   losartan (Cozaar) 25 mg tablet Take 1 tablet (25 mg) by mouth once daily. 7/31/24   Ladonna Zepeda DO   metoprolol succinate XL (Toprol-XL) 25 mg 24 hr tablet Take 1 tablet (25 mg) by mouth once daily.    Historical Provider, MD   multivit-min/folic/vit K/lycop (ONE DAILY MEN'S 50 PLUS W-D3 ORAL) Take by mouth.    Historical Provider, MD   naproxen sodium (Aleve) 220 mg tablet Take 1 tablet (220 mg) by mouth 2 times daily (morning and late afternoon).    Historical Provider, MD   turm/ging/liza/yuc/kojo/bijal/hor (TUMERSAID ORAL) Take by mouth.    Historical Provider, MD   Xarelto 20 mg tablet Take 1 tablet (20 mg) by mouth once daily.    Historical Provider, MD     Allergies:  Aspirin, Mold, NSAIDs, Ragweed     Family History:   Mother with breast cancer.  Father with colon cancer, diagnosed at age 93.  Paternal grandfather with prostate cancer.     Social History:  Non-smoker.  Alcohol use.  No drug use.  .     ROS:  Constitutional: + History of skin cancer  Cardiovascular: + Irregular heartbeat  Respiratory: No cough or shortness of breath  Gastrointestinal: + Left groin pain, occasional constipation  Genitourinary: + Frequent urination, history of kidney stones  Musculoskeletal: + Neck pain, arthritis  Integumentary: no rashes  Neurological: no confusion  Endocrine: no heat or cold intolerance  Heme/Lymph: + Easy bruising, on Xarelto for history of  "DVT    Objective:  /76   Pulse 55   Ht 1.905 m (6' 3\")   Wt 123 kg (272 lb)   BMI 34.00 kg/m²     Physical Exam:  Constitutional: No acute distress, conversant, pleasant  Neurologic: alert and oriented  Psych: appropriate affect  Ears, Nose, Mouth and Throat: mucus membranes moist  Pulmonary: No labored breathing  Cardiovascular: Regular rate and rhythm  Abdomen: Nondistended, BMI 34, nontender at epigastric hernia site  Musculoskeletal: Moves all extremities, no edema  Skin: no jaundice     Labs/Imaging:   CT abdomen and pelvis from 8/15/24 reviewed: Epigastric hernia measuring 2.4 cm.  No inguinal hernia or groin mass.     Assessment and Plan: Avelino Luna is a 78 y.o. old male with left groin pain.  I did not appreciate a hernia on physical exam, and CT scan did not show the presence of a hernia either.  It is possible that this was just musculoskeletal pain in the groin that he developed in the moving process while doing activity that he does not normally do.  If anything changes, I would be happy to reevaluate.  His last colonoscopy was in 2016.  His father had colon cancer.  We therefore discussed getting him up-to-date on screening colonoscopy as he is establishing care in the area.  We discussed the risks of this procedure.  This included risks of bleeding, perforation, missed polyps, incomplete colonoscopy, and potential need for additional procedures pending findings.  He was agreeable to proceed.  Bowel prep instructions were reviewed and all questions were answered.  He is scheduled for colonoscopy on 9/9/24.    Ladonna Lopez MD  8/22/2024    "

## 2024-08-23 ENCOUNTER — DOCUMENTATION (OUTPATIENT)
Dept: SURGERY | Facility: CLINIC | Age: 79
End: 2024-08-23
Payer: MEDICARE

## 2024-08-23 NOTE — PROGRESS NOTES
Notified patient of Colonoscopy       scheduled on 9-9-24  .Instructions reviewed. Advised patient [facility] will contact them 24 hours before surgery with arrival time. Pt voices understanding. Uzma De La Fuente MA.

## 2024-08-26 DIAGNOSIS — I10 PRIMARY HYPERTENSION: Primary | ICD-10-CM

## 2024-08-26 RX ORDER — FUROSEMIDE 20 MG/1
20 TABLET ORAL DAILY
Qty: 90 TABLET | Refills: 3 | Status: SHIPPED | OUTPATIENT
Start: 2024-08-26

## 2024-09-03 DIAGNOSIS — I48.21 PERMANENT ATRIAL FIBRILLATION (MULTI): Primary | ICD-10-CM

## 2024-09-03 DIAGNOSIS — I10 PRIMARY HYPERTENSION: ICD-10-CM

## 2024-09-03 RX ORDER — FUROSEMIDE 20 MG/1
20 TABLET ORAL DAILY
Qty: 90 TABLET | Refills: 3 | Status: SHIPPED | OUTPATIENT
Start: 2024-09-03 | End: 2024-09-05 | Stop reason: SDUPTHER

## 2024-09-05 DIAGNOSIS — I10 PRIMARY HYPERTENSION: ICD-10-CM

## 2024-09-05 DIAGNOSIS — I48.21 PERMANENT ATRIAL FIBRILLATION (MULTI): ICD-10-CM

## 2024-09-05 RX ORDER — FUROSEMIDE 20 MG/1
20 TABLET ORAL DAILY
Qty: 90 TABLET | Refills: 3 | Status: SHIPPED | OUTPATIENT
Start: 2024-09-05

## 2024-09-09 ENCOUNTER — HOSPITAL ENCOUNTER (OUTPATIENT)
Dept: GASTROENTEROLOGY | Facility: CLINIC | Age: 79
Setting detail: OUTPATIENT SURGERY
Discharge: HOME | End: 2024-09-09
Payer: MEDICARE

## 2024-09-09 VITALS
OXYGEN SATURATION: 99 % | BODY MASS INDEX: 33.35 KG/M2 | DIASTOLIC BLOOD PRESSURE: 68 MMHG | TEMPERATURE: 97.3 F | HEART RATE: 58 BPM | WEIGHT: 268.2 LBS | HEIGHT: 75 IN | RESPIRATION RATE: 16 BRPM | SYSTOLIC BLOOD PRESSURE: 114 MMHG

## 2024-09-09 DIAGNOSIS — Z12.11 COLON CANCER SCREENING: ICD-10-CM

## 2024-09-09 PROCEDURE — 2500000004 HC RX 250 GENERAL PHARMACY W/ HCPCS (ALT 636 FOR OP/ED): Performed by: SURGERY

## 2024-09-09 PROCEDURE — 45378 DIAGNOSTIC COLONOSCOPY: CPT | Performed by: SURGERY

## 2024-09-09 PROCEDURE — 7100000010 HC PHASE TWO TIME - EACH INCREMENTAL 1 MINUTE

## 2024-09-09 PROCEDURE — 3700000012 HC SEDATION LEVEL 5+ TIME - INITIAL 15 MINUTES 5/> YEARS

## 2024-09-09 PROCEDURE — G0500 MOD SEDAT ENDO SERVICE >5YRS: HCPCS | Performed by: SURGERY

## 2024-09-09 PROCEDURE — G0105 COLORECTAL SCRN; HI RISK IND: HCPCS | Performed by: SURGERY

## 2024-09-09 PROCEDURE — 99153 MOD SED SAME PHYS/QHP EA: CPT | Performed by: SURGERY

## 2024-09-09 PROCEDURE — 3700000013 HC SEDATION LEVEL 5+ TIME - EACH ADDITIONAL 15 MINUTES

## 2024-09-09 PROCEDURE — 7100000009 HC PHASE TWO TIME - INITIAL BASE CHARGE

## 2024-09-09 RX ORDER — FENTANYL CITRATE 50 UG/ML
INJECTION, SOLUTION INTRAMUSCULAR; INTRAVENOUS AS NEEDED
Status: COMPLETED | OUTPATIENT
Start: 2024-09-09 | End: 2024-09-09

## 2024-09-09 RX ORDER — SODIUM CHLORIDE, SODIUM LACTATE, POTASSIUM CHLORIDE, CALCIUM CHLORIDE 600; 310; 30; 20 MG/100ML; MG/100ML; MG/100ML; MG/100ML
20 INJECTION, SOLUTION INTRAVENOUS CONTINUOUS
Status: DISCONTINUED | OUTPATIENT
Start: 2024-09-09 | End: 2024-09-10 | Stop reason: HOSPADM

## 2024-09-09 RX ORDER — MIDAZOLAM HYDROCHLORIDE 5 MG/ML
INJECTION, SOLUTION INTRAMUSCULAR; INTRAVENOUS AS NEEDED
Status: COMPLETED | OUTPATIENT
Start: 2024-09-09 | End: 2024-09-09

## 2024-09-09 ASSESSMENT — PAIN - FUNCTIONAL ASSESSMENT
PAIN_FUNCTIONAL_ASSESSMENT: 0-10

## 2024-09-09 ASSESSMENT — PAIN SCALES - GENERAL
PAINLEVEL_OUTOF10: 0 - NO PAIN
PAINLEVEL_OUTOF10: 4
PAINLEVEL_OUTOF10: 0 - NO PAIN

## 2024-09-10 ENCOUNTER — TELEPHONE (OUTPATIENT)
Dept: SURGERY | Facility: CLINIC | Age: 79
End: 2024-09-10
Payer: MEDICARE

## 2024-09-10 NOTE — TELEPHONE ENCOUNTER
Patient returned call. He is doing great after colonoscopy. I did advise him he needs to have repeat in 5 yrs. /ac    Left message to see how patient was doing after colonscopy. Provided office phone number.Pt needs to repeat in 5 years.

## 2024-11-04 ENCOUNTER — OFFICE VISIT (OUTPATIENT)
Dept: URGENT CARE | Facility: CLINIC | Age: 79
End: 2024-11-04
Payer: MEDICARE

## 2024-11-04 VITALS
RESPIRATION RATE: 14 BRPM | WEIGHT: 270 LBS | BODY MASS INDEX: 35.78 KG/M2 | TEMPERATURE: 97.7 F | HEIGHT: 73 IN | SYSTOLIC BLOOD PRESSURE: 134 MMHG | DIASTOLIC BLOOD PRESSURE: 84 MMHG | OXYGEN SATURATION: 97 % | HEART RATE: 77 BPM

## 2024-11-04 DIAGNOSIS — J20.9 ACUTE BRONCHITIS, UNSPECIFIED ORGANISM: Primary | ICD-10-CM

## 2024-11-04 PROCEDURE — 99213 OFFICE O/P EST LOW 20 MIN: CPT | Performed by: NURSE PRACTITIONER

## 2024-11-04 RX ORDER — ALBUTEROL SULFATE 90 UG/1
2 INHALANT RESPIRATORY (INHALATION) EVERY 6 HOURS PRN
Qty: 18 G | Refills: 0 | Status: SHIPPED | OUTPATIENT
Start: 2024-11-04 | End: 2025-11-04

## 2024-11-04 RX ORDER — DOXYCYCLINE 100 MG/1
100 TABLET ORAL 2 TIMES DAILY
Qty: 14 TABLET | Refills: 0 | Status: SHIPPED | OUTPATIENT
Start: 2024-11-04 | End: 2024-11-11

## 2024-11-04 RX ORDER — PREDNISONE 10 MG/1
30 TABLET ORAL DAILY
Qty: 15 TABLET | Refills: 0 | Status: SHIPPED | OUTPATIENT
Start: 2024-11-04 | End: 2024-11-09

## 2024-11-04 NOTE — PROGRESS NOTES
79 y.o. male presents for evaluation of URI.  Symptoms including cough, congestion, body aches, malaise, and headache have been present for 4 days and refractory to OTC meds.  No fever, chills, nausea, vomiting, abdominal pain, CP, or SOB.  Wife ill with same symptoms. No known COVID 19/flu exposure.      Vitals:    11/04/24 1316   BP: 134/84   Pulse: 77   Resp: 14   Temp: 36.5 °C (97.7 °F)   SpO2: 97%     Allergies   Allergen Reactions    Aspirin Unknown    Mold Unknown    Nsaids (Non-Steroidal Anti-Inflammatory Drug) Unknown    Ragweed Unknown       Medication Documentation Review Audit       Reviewed by Farhana Del Toro MA (Medical Assistant) on 11/04/24 at 1315      Medication Order Taking? Sig Documenting Provider Last Dose Status   acetaminophen (Tylenol 8 HOUR) 650 mg ER tablet [id number] Yes Take 1 tablet (650 mg) by mouth every 8 hours if needed for mild pain (1 - 3). Do not crush, chew, or split. Historical Provider, MD Past Week Active   amiodarone (Pacerone) 200 mg tablet [id number] Yes Take 1 tablet (200 mg) by mouth once daily. Ladonna Zepeda DO Past Week Active   capsicum, cayenne, 450 mg capsule [id number] Yes Take by mouth. Historical Provider, MD Past Week Active   collagen/biotin/ascorbic acid (COLLAGEN 1500 PLUS C ORAL) [id number] Yes Take by mouth. Historical Provider, MD Past Week Active   furosemide (Lasix) 20 mg tablet [id number] Yes Take 1 tablet (20 mg) by mouth once daily. Ladonna Zepeda DO Past Week Active   gabapentin (Neurontin) 100 mg capsule [id number] Yes Take 1 capsule (100 mg) by mouth 3 times a day. Ladonna Zepeda DO Past Week Active   levothyroxine (Synthroid, Levoxyl) 75 mcg tablet [id number] Yes Take 1 tablet (75 mcg) by mouth once daily. Ladonna Zepeda DO Past Week Active   losartan (Cozaar) 25 mg tablet [id number] Yes Take 1 tablet (25 mg) by mouth once daily. Ladonna Zepeda DO Past Week Active   metoprolol succinate XL (Toprol-XL) 25 mg 24 hr tablet [id number]  Yes Take 1 tablet (25 mg) by mouth once daily. Historical Provider, MD Past Week Active   multivit-min/folic/vit K/lycop (ONE DAILY MEN'S 50 PLUS W-D3 ORAL) [id number] Yes Take by mouth. Historical Provider, MD Past Week Active   naproxen sodium (Aleve) 220 mg tablet [id number] Yes Take 1 tablet (220 mg) by mouth 2 times daily (morning and late afternoon). Historical Provider, MD Past Week Active   rivaroxaban (Xarelto) 20 mg tablet [id number] Yes Take 1 tablet (20 mg) by mouth once daily. Ladonna Zepeda DO Past Week Active   turm/ging/liza/yuc/kojo/bijal/hor (TUMERSAID ORAL) [id number] Yes Take by mouth. Historical Provider, MD Past Week Active                    Past Medical History:   Diagnosis Date    A-fib (Multi)     Aortic aneurysm (CMS-HCC)     Cervical spondylarthritis     Disease of thyroid gland     DVT (deep venous thrombosis) (Multi)     Hypertension     Lymphedema     Osteoarthritis     Plantar fasciitis, bilateral     RA (rheumatoid arthritis)        Past Surgical History:   Procedure Laterality Date    CARPAL TUNNEL RELEASE Bilateral     CATARACT EXTRACTION Right     CHOLECYSTECTOMY      COLONOSCOPY  2016    COLONOSCOPY  09/09/2024    Repeat 5 years  fam  hx of colon CA       ORCHIECTOMY      SQUAMOUS CELL CARCINOMA EXCISION      on scalp    TOTAL KNEE ARTHROPLASTY Bilateral        ROS  See HPI    Physical Exam  Vitals and nursing note reviewed.   Constitutional:       Appearance: He is ill-appearing (mildly).   HENT:      Head: Normocephalic and atraumatic.      Right Ear: Tympanic membrane, ear canal and external ear normal.      Left Ear: Tympanic membrane, ear canal and external ear normal.      Nose: Congestion present.      Mouth/Throat:      Mouth: Mucous membranes are moist.      Pharynx: Oropharynx is clear.   Eyes:      Extraocular Movements: Extraocular movements intact.      Conjunctiva/sclera: Conjunctivae normal.      Pupils: Pupils are equal, round, and reactive to light.    Cardiovascular:      Rate and Rhythm: Normal rate.   Pulmonary:      Effort: Pulmonary effort is normal. No tachypnea, accessory muscle usage or respiratory distress.      Breath sounds: Decreased air movement present. Wheezing and rhonchi present. No rales.   Skin:     General: Skin is warm.   Neurological:      General: No focal deficit present.      Mental Status: He is alert and oriented to person, place, and time.   Psychiatric:         Mood and Affect: Mood normal.         Behavior: Behavior normal.           Assessment/Plan/MDM  Avelino was seen today for uri.  Diagnoses and all orders for this visit:  Acute bronchitis, unspecified organism (Primary)  -     doxycycline (Adoxa) 100 mg tablet; Take 1 tablet (100 mg) by mouth 2 times a day for 7 days. Take with a full glass of water and do not lie down for at least 30 minutes after  -     predniSONE (Deltasone) 10 mg tablet; Take 3 tablets (30 mg) by mouth once daily for 5 days.  -     albuterol 90 mcg/actuation inhaler; Inhale 2 puffs every 6 hours if needed for wheezing.    Encouraged pt to use otc cold remedies PRN, push PO fluids and rest. Patient's clinical presentation is otherwise unremarkable at this time. Patient is discharged with instructions to follow-up with primary care or seek emergency medical attention for worsening symptoms or any new concerns.    I did personally review Avelino's past medical history, surgical history, social history, as well as family history (when relevant).  In this case, I also oversaw the his drug management by reviewing his medication list, allergy list, as well as the medications that I prescribed during the UC course and/or recommended as an out-patient (including possible OTC medications such as acetaminophen, NSAIDs , etc).    After reviewing the items above, I did look at previous medical documentation, such as recent hospitalizations, office visits, and/or recent consultations with PCP/specialist.                           SDOH:   Another factor that I considered in Avelino's care was his Social Determinants of Health (SDOH). During this UC encounter, he did not have social determinants of health. Those SDOH influencing Avelino's care are: none      Gen Schafer CNP  [facility]  [phone]

## 2024-11-05 ENCOUNTER — APPOINTMENT (OUTPATIENT)
Dept: PRIMARY CARE | Facility: CLINIC | Age: 79
End: 2024-11-05
Payer: MEDICARE

## 2025-01-08 ENCOUNTER — OFFICE VISIT (OUTPATIENT)
Dept: URGENT CARE | Facility: CLINIC | Age: 80
End: 2025-01-08
Payer: MEDICARE

## 2025-01-08 VITALS
RESPIRATION RATE: 18 BRPM | OXYGEN SATURATION: 97 % | HEART RATE: 66 BPM | SYSTOLIC BLOOD PRESSURE: 139 MMHG | TEMPERATURE: 98.4 F | DIASTOLIC BLOOD PRESSURE: 78 MMHG

## 2025-01-08 DIAGNOSIS — L03.116 CELLULITIS OF LEFT ANTERIOR LOWER LEG: Primary | ICD-10-CM

## 2025-01-08 PROCEDURE — 99213 OFFICE O/P EST LOW 20 MIN: CPT | Performed by: NURSE PRACTITIONER

## 2025-01-08 RX ORDER — MUPIROCIN 20 MG/G
OINTMENT TOPICAL
Qty: 15 G | Refills: 0 | Status: SHIPPED | OUTPATIENT
Start: 2025-01-08 | End: 2025-01-15

## 2025-01-08 RX ORDER — DOXYCYCLINE 100 MG/1
100 TABLET ORAL 2 TIMES DAILY
Qty: 14 TABLET | Refills: 0 | Status: SHIPPED | OUTPATIENT
Start: 2025-01-08 | End: 2025-01-15

## 2025-01-08 NOTE — PROGRESS NOTES
79 y.o. male presents for evaluation of left lower leg redness and swelling to anterior aspect of leg that has been present for several weeks. States it started out as an abrasion and it will improve and then worsen again. Denies fevers, drainage from area of concern. Does wear support stockings. No other complaints.    Vitals:    01/08/25 1307   BP: 139/78   Pulse: 66   Resp: 18   Temp: 36.9 °C (98.4 °F)   SpO2: 97%       Allergies   Allergen Reactions    Aspirin Unknown    Mold Unknown    Nsaids (Non-Steroidal Anti-Inflammatory Drug) Unknown    Ragweed Unknown       Medication Documentation Review Audit       Reviewed by Ramana Graves MA (Medical Assistant) on 01/08/25 at 1307      Medication Order Taking? Sig Documenting Provider Last Dose Status   acetaminophen (Tylenol 8 HOUR) 650 mg ER tablet [id number] Yes Take 1 tablet (650 mg) by mouth every 8 hours if needed for mild pain (1 - 3). Do not crush, chew, or split. Historical Provider, MD Past Week Active   albuterol 90 mcg/actuation inhaler [id number] Yes Inhale 2 puffs every 6 hours if needed for wheezing. Gen Schafer, APRN-CNP  Active   amiodarone (Pacerone) 200 mg tablet [id number] Yes Take 1 tablet (200 mg) by mouth once daily. Ladonna Zepeda DO Past Week Active   capsicum, cayenne, 450 mg capsule [id number] Yes Take by mouth. Historical Provider, MD Past Week Active   collagen/biotin/ascorbic acid (COLLAGEN 1500 PLUS C ORAL) [id number] Yes Take by mouth. Historical Provider, MD Past Week Active   furosemide (Lasix) 20 mg tablet [id number] Yes Take 1 tablet (20 mg) by mouth once daily. Ladonna Zepeda DO Past Week Active   gabapentin (Neurontin) 100 mg capsule [id number] Yes Take 1 capsule (100 mg) by mouth 3 times a day. Ladonna Zepeda DO Past Week Active   levothyroxine (Synthroid, Levoxyl) 75 mcg tablet [id number] Yes Take 1 tablet (75 mcg) by mouth once daily. Ladonna Zepeda DO Past Week Active   losartan (Cozaar) 25 mg tablet  [id number] Yes Take 1 tablet (25 mg) by mouth once daily. Ladonna Zepeda DO Past Week Active   metoprolol succinate XL (Toprol-XL) 25 mg 24 hr tablet [id number] Yes Take 1 tablet (25 mg) by mouth once daily. Historical Provider, MD Past Week Active   multivit-min/folic/vit K/lycop (ONE DAILY MEN'S 50 PLUS W-D3 ORAL) [id number] Yes Take by mouth. Historical Provider, MD Past Week Active   naproxen sodium (Aleve) 220 mg tablet [id number] Yes Take 1 tablet (220 mg) by mouth 2 times daily (morning and late afternoon). Historical Provider, MD Past Week Active   rivaroxaban (Xarelto) 20 mg tablet [id number] Yes Take 1 tablet (20 mg) by mouth once daily. Ladonna Zepeda DO Past Week Active   turm/ging/liza/yuc/kojo/bijal/hor (TUMERSAID ORAL) [id number] Yes Take by mouth. Historical Provider, MD Past Week Active                    Past Medical History:   Diagnosis Date    A-fib (Multi)     Aortic aneurysm (CMS-HCC)     Cervical spondylarthritis     Disease of thyroid gland     DVT (deep venous thrombosis) (Multi)     Hypertension     Lymphedema     Osteoarthritis     Plantar fasciitis, bilateral     RA (rheumatoid arthritis)        Past Surgical History:   Procedure Laterality Date    CARPAL TUNNEL RELEASE Bilateral     CATARACT EXTRACTION Right     CHOLECYSTECTOMY      COLONOSCOPY  2016    COLONOSCOPY  09/09/2024    Repeat 5 years  fam  hx of colon CA       ORCHIECTOMY      SQUAMOUS CELL CARCINOMA EXCISION      on scalp    TOTAL KNEE ARTHROPLASTY Bilateral        ROS  See HPI    Physical Exam  Vitals and nursing note reviewed.   Constitutional:       Appearance: Normal appearance.   Skin:     General: Skin is warm and dry.      Findings: Erythema (to anterior left lower leg with 3, 2 cm scabbed over abrasion with surrounding erythema and mild edema, no streaking or drainage) present.      Comments: Vascular discoloration to bilateral lower legs   Neurological:      General: No focal deficit present.      Mental  Status: He is alert and oriented to person, place, and time.   Psychiatric:         Mood and Affect: Mood normal.         Behavior: Behavior normal.           Assessment/Plan/MDM  Avelino was seen today for leg swelling.  Diagnoses and all orders for this visit:  Cellulitis of left anterior lower leg (Primary)  -     doxycycline (Adoxa) 100 mg tablet; Take 1 tablet (100 mg) by mouth 2 times a day for 7 days. Take with a full glass of water and do not lie down for at least 30 minutes after  -     mupirocin (Bactroban) 2 % ointment; Apply topically 3 times a day for 7 days.    Discussed wound care with patient.  Patient's clinical presentation is otherwise unremarkable at this time. Patient is discharged with instructions to follow-up with primary care or seek emergency medical attention for worsening symptoms or any new concerns.    I did personally review Avelino's past medical history, surgical history, social history, as well as family history (when relevant).  In this case, I also oversaw the his drug management by reviewing his medication list, allergy list, as well as the medications that I prescribed during the UC course and/or recommended as an out-patient (including possible OTC medications such as acetaminophen, NSAIDs , etc).    After reviewing the items above, I did look at previous medical documentation, such as recent hospitalizations, office visits, and/or recent consultations with PCP/specialist.                          SDOH:   Another factor that I considered in Avelino's care was his Social Determinants of Health (SDOH). During this UC encounter, he did not have social determinants of health. Those SDOH influencing Avelino's care are: none      Gen Schafer CNP  [facility]  [phone]

## 2025-01-21 DIAGNOSIS — Z12.5 ENCOUNTER FOR SCREENING FOR MALIGNANT NEOPLASM OF PROSTATE: ICD-10-CM

## 2025-01-21 DIAGNOSIS — E03.9 HYPOTHYROIDISM, UNSPECIFIED: ICD-10-CM

## 2025-01-21 DIAGNOSIS — E78.2 MIXED HYPERLIPIDEMIA: ICD-10-CM

## 2025-01-21 DIAGNOSIS — I10 ESSENTIAL (PRIMARY) HYPERTENSION: Primary | ICD-10-CM

## 2025-06-09 ENCOUNTER — ANTICOAGULATION - WARFARIN VISIT (OUTPATIENT)
Dept: PHARMACY | Facility: HOSPITAL | Age: 80
End: 2025-06-09
Payer: MEDICARE

## 2025-06-09 DIAGNOSIS — I48.19 PERSISTENT ATRIAL FIBRILLATION (MULTI): Primary | ICD-10-CM

## 2025-06-09 RX ORDER — WARFARIN SODIUM 5 MG/1
TABLET ORAL
Qty: 30 TABLET | Refills: 0 | OUTPATIENT
Start: 2025-06-09 | End: 2026-02-04

## 2025-06-09 NOTE — PROGRESS NOTES
Pt enrolled in [facility] for management of Persistent atrial fibrillation (Multi) [I48.19].     Current anticoagulant: Rivaroxaban    Pt is currently on Xarelto for a fib/stroke prevention. However, he reports a $400+/3 month copay. He states that this is too much. I did review the Medicare cap of $2000/year for all medications.    He has about 18 doses of Xarelto left    Plan:  Patient was instructed to continue with Xarelto for now. He is going to do the math on his copays and max out of pocket and see if he will reach that soon.  I will send out warfarin 5mg tablet to [facility] to start on 6/23, overlapping with Xarelto for the last 2 days. He will then have just 2 days of warfarin.  INR will occur in 3 weeks, or 4 days after starting warfarin.  He is to call us if he stays on Xarelto  Patient was instructed to maintain consistent vegetable intake, to monitor for any bruising or bleeding, and to call with any medication changes or concerns.    Pt handout given with above information    Gatito Jauregui, PharmD  P:[phone]  F:[phone]

## 2025-06-17 ENCOUNTER — APPOINTMENT (OUTPATIENT)
Dept: RADIOLOGY | Facility: HOSPITAL | Age: 80
End: 2025-06-17
Payer: MEDICARE

## 2025-06-17 ENCOUNTER — APPOINTMENT (OUTPATIENT)
Dept: CARDIOLOGY | Facility: HOSPITAL | Age: 80
End: 2025-06-17
Payer: MEDICARE

## 2025-06-17 ENCOUNTER — HOSPITAL ENCOUNTER (EMERGENCY)
Facility: HOSPITAL | Age: 80
Discharge: HOME | End: 2025-06-17
Attending: EMERGENCY MEDICINE
Payer: MEDICARE

## 2025-06-17 VITALS
HEIGHT: 75 IN | WEIGHT: 270 LBS | DIASTOLIC BLOOD PRESSURE: 82 MMHG | RESPIRATION RATE: 16 BRPM | HEART RATE: 44 BPM | SYSTOLIC BLOOD PRESSURE: 134 MMHG | TEMPERATURE: 97.9 F | OXYGEN SATURATION: 96 % | BODY MASS INDEX: 33.57 KG/M2

## 2025-06-17 DIAGNOSIS — R00.1 BRADYCARDIA: ICD-10-CM

## 2025-06-17 DIAGNOSIS — R42 VERTIGO: Primary | ICD-10-CM

## 2025-06-17 LAB
ALBUMIN SERPL BCP-MCNC: 4 G/DL (ref 3.4–5)
ALP SERPL-CCNC: 47 U/L (ref 33–136)
ALT SERPL W P-5'-P-CCNC: 10 U/L (ref 10–52)
ANION GAP SERPL CALC-SCNC: 12 MMOL/L (ref 10–20)
APPEARANCE UR: CLEAR
APTT PPP: 36 SECONDS (ref 26–36)
AST SERPL W P-5'-P-CCNC: 17 U/L (ref 9–39)
BASOPHILS # BLD AUTO: 0.02 X10*3/UL (ref 0–0.1)
BASOPHILS NFR BLD AUTO: 0.3 %
BILIRUB SERPL-MCNC: 0.8 MG/DL (ref 0–1.2)
BILIRUB UR STRIP.AUTO-MCNC: NEGATIVE MG/DL
BUN SERPL-MCNC: 20 MG/DL (ref 6–23)
CALCIUM SERPL-MCNC: 9.1 MG/DL (ref 8.6–10.3)
CARDIAC TROPONIN I PNL SERPL HS: 4 NG/L (ref 0–20)
CHLORIDE SERPL-SCNC: 108 MMOL/L (ref 98–107)
CO2 SERPL-SCNC: 24 MMOL/L (ref 21–32)
COLOR UR: NORMAL
CREAT SERPL-MCNC: 1.47 MG/DL (ref 0.5–1.3)
EGFRCR SERPLBLD CKD-EPI 2021: 49 ML/MIN/1.73M*2
EOSINOPHIL # BLD AUTO: 0.17 X10*3/UL (ref 0–0.4)
EOSINOPHIL NFR BLD AUTO: 2.8 %
ERYTHROCYTE [DISTWIDTH] IN BLOOD BY AUTOMATED COUNT: 13.4 % (ref 11.5–14.5)
GLUCOSE SERPL-MCNC: 101 MG/DL (ref 74–99)
GLUCOSE UR STRIP.AUTO-MCNC: NORMAL MG/DL
HCT VFR BLD AUTO: 39.1 % (ref 41–52)
HGB BLD-MCNC: 13.5 G/DL (ref 13.5–17.5)
IMM GRANULOCYTES # BLD AUTO: 0.01 X10*3/UL (ref 0–0.5)
IMM GRANULOCYTES NFR BLD AUTO: 0.2 % (ref 0–0.9)
INR PPP: 1.6 (ref 0.9–1.1)
KETONES UR STRIP.AUTO-MCNC: NEGATIVE MG/DL
LEUKOCYTE ESTERASE UR QL STRIP.AUTO: NEGATIVE
LYMPHOCYTES # BLD AUTO: 1.99 X10*3/UL (ref 0.8–3)
LYMPHOCYTES NFR BLD AUTO: 32.9 %
MAGNESIUM SERPL-MCNC: 2.08 MG/DL (ref 1.6–2.4)
MCH RBC QN AUTO: 34 PG (ref 26–34)
MCHC RBC AUTO-ENTMCNC: 34.5 G/DL (ref 32–36)
MCV RBC AUTO: 99 FL (ref 80–100)
MONOCYTES # BLD AUTO: 0.46 X10*3/UL (ref 0.05–0.8)
MONOCYTES NFR BLD AUTO: 7.6 %
NEUTROPHILS # BLD AUTO: 3.39 X10*3/UL (ref 1.6–5.5)
NEUTROPHILS NFR BLD AUTO: 56.2 %
NITRITE UR QL STRIP.AUTO: NEGATIVE
NRBC BLD-RTO: 0 /100 WBCS (ref 0–0)
PH UR STRIP.AUTO: 6.5 [PH]
PLATELET # BLD AUTO: 166 X10*3/UL (ref 150–450)
POTASSIUM SERPL-SCNC: 4.1 MMOL/L (ref 3.5–5.3)
PROT SERPL-MCNC: 6.8 G/DL (ref 6.4–8.2)
PROT UR STRIP.AUTO-MCNC: NEGATIVE MG/DL
PROTHROMBIN TIME: 18 SECONDS (ref 9.8–12.4)
RBC # BLD AUTO: 3.97 X10*6/UL (ref 4.5–5.9)
RBC # UR STRIP.AUTO: NEGATIVE MG/DL
SODIUM SERPL-SCNC: 140 MMOL/L (ref 136–145)
SP GR UR STRIP.AUTO: 1.01
UROBILINOGEN UR STRIP.AUTO-MCNC: NORMAL MG/DL
WBC # BLD AUTO: 6 X10*3/UL (ref 4.4–11.3)

## 2025-06-17 PROCEDURE — 36415 COLL VENOUS BLD VENIPUNCTURE: CPT | Performed by: EMERGENCY MEDICINE

## 2025-06-17 PROCEDURE — 96361 HYDRATE IV INFUSION ADD-ON: CPT

## 2025-06-17 PROCEDURE — 70450 CT HEAD/BRAIN W/O DYE: CPT | Performed by: RADIOLOGY

## 2025-06-17 PROCEDURE — 84484 ASSAY OF TROPONIN QUANT: CPT | Performed by: EMERGENCY MEDICINE

## 2025-06-17 PROCEDURE — 70450 CT HEAD/BRAIN W/O DYE: CPT

## 2025-06-17 PROCEDURE — 81003 URINALYSIS AUTO W/O SCOPE: CPT | Performed by: EMERGENCY MEDICINE

## 2025-06-17 PROCEDURE — 85730 THROMBOPLASTIN TIME PARTIAL: CPT | Performed by: EMERGENCY MEDICINE

## 2025-06-17 PROCEDURE — 71045 X-RAY EXAM CHEST 1 VIEW: CPT | Performed by: RADIOLOGY

## 2025-06-17 PROCEDURE — 71045 X-RAY EXAM CHEST 1 VIEW: CPT

## 2025-06-17 PROCEDURE — 85025 COMPLETE CBC W/AUTO DIFF WBC: CPT | Performed by: EMERGENCY MEDICINE

## 2025-06-17 PROCEDURE — 99285 EMERGENCY DEPT VISIT HI MDM: CPT | Mod: 25 | Performed by: EMERGENCY MEDICINE

## 2025-06-17 PROCEDURE — 2500000004 HC RX 250 GENERAL PHARMACY W/ HCPCS (ALT 636 FOR OP/ED): Performed by: EMERGENCY MEDICINE

## 2025-06-17 PROCEDURE — 85610 PROTHROMBIN TIME: CPT | Performed by: EMERGENCY MEDICINE

## 2025-06-17 PROCEDURE — 96372 THER/PROPH/DIAG INJ SC/IM: CPT | Performed by: EMERGENCY MEDICINE

## 2025-06-17 PROCEDURE — 93005 ELECTROCARDIOGRAM TRACING: CPT

## 2025-06-17 PROCEDURE — 83735 ASSAY OF MAGNESIUM: CPT | Performed by: EMERGENCY MEDICINE

## 2025-06-17 PROCEDURE — 2500000002 HC RX 250 W HCPCS SELF ADMINISTERED DRUGS (ALT 637 FOR MEDICARE OP, ALT 636 FOR OP/ED): Performed by: EMERGENCY MEDICINE

## 2025-06-17 PROCEDURE — 96374 THER/PROPH/DIAG INJ IV PUSH: CPT

## 2025-06-17 PROCEDURE — 80053 COMPREHEN METABOLIC PANEL: CPT | Performed by: EMERGENCY MEDICINE

## 2025-06-17 RX ORDER — ATORVASTATIN CALCIUM 10 MG/1
10 TABLET, FILM COATED ORAL
COMMUNITY
Start: 2024-10-24

## 2025-06-17 RX ORDER — MECLIZINE HYDROCHLORIDE 25 MG/1
25 TABLET ORAL ONCE
Status: COMPLETED | OUTPATIENT
Start: 2025-06-17 | End: 2025-06-17

## 2025-06-17 RX ORDER — ONDANSETRON HYDROCHLORIDE 2 MG/ML
4 INJECTION, SOLUTION INTRAVENOUS ONCE
Status: COMPLETED | OUTPATIENT
Start: 2025-06-17 | End: 2025-06-17

## 2025-06-17 RX ORDER — DOCUSATE SODIUM 100 MG/1
100 CAPSULE, LIQUID FILLED ORAL
COMMUNITY

## 2025-06-17 RX ORDER — DIAZEPAM 5 MG/1
2.5 TABLET ORAL ONCE
Status: COMPLETED | OUTPATIENT
Start: 2025-06-17 | End: 2025-06-17

## 2025-06-17 RX ORDER — DIAZEPAM 5 MG/1
2.5 TABLET ORAL 2 TIMES DAILY PRN
Qty: 3 TABLET | Refills: 0 | Status: SHIPPED | OUTPATIENT
Start: 2025-06-17

## 2025-06-17 RX ORDER — MECLIZINE HYDROCHLORIDE 25 MG/1
25 TABLET ORAL 3 TIMES DAILY PRN
Qty: 30 TABLET | Refills: 0 | Status: SHIPPED | OUTPATIENT
Start: 2025-06-17 | End: 2025-06-27

## 2025-06-17 RX ADMIN — MECLIZINE HYDROCHLORIDE 25 MG: 25 TABLET ORAL at 14:06

## 2025-06-17 RX ADMIN — DIAZEPAM 2.5 MG: 5 TABLET ORAL at 15:48

## 2025-06-17 RX ADMIN — ONDANSETRON 4 MG: 2 INJECTION INTRAMUSCULAR; INTRAVENOUS at 16:16

## 2025-06-17 RX ADMIN — SODIUM CHLORIDE 1000 ML: 0.9 INJECTION, SOLUTION INTRAVENOUS at 14:05

## 2025-06-17 RX ADMIN — GLUCAGON 1 MG: 1 INJECTION, POWDER, LYOPHILIZED, FOR SOLUTION INTRAMUSCULAR; INTRAVENOUS at 16:17

## 2025-06-17 ASSESSMENT — PAIN SCALES - GENERAL: PAINLEVEL_OUTOF10: 0 - NO PAIN

## 2025-06-17 ASSESSMENT — PAIN - FUNCTIONAL ASSESSMENT: PAIN_FUNCTIONAL_ASSESSMENT: 0-10

## 2025-06-17 NOTE — ED PROVIDER NOTES
"HPI   Chief Complaint   Patient presents with    Dizziness     Pt states \"vertigo\" since yesterday morning. C/o dizzines and nausea.        Limitations to History: None    HPI: 79-year-old male presents with concern for vertigo.  Began yesterday morning.  Room spinning.  Began having vomiting last night.  Improved when he closes his eyes.  States that he did fall approximately 1 week ago but did not strike his head.  Denies any fever, chills, vision change, neck pain, chest pain, shortness of breath, urinary symptoms.    ------------------------------------------------------------------------------------------------------------------------------------------  Physical Exam:    VS: As documented in the triage note and EMR flowsheet from this visit were reviewed.    Appearance: Alert. cooperative,  in no acute distress.   Skin: Intact,  dry skin, no lesions, rash, petechiae or purpura.   Eyes: PERRLA, EOMs intact,  Conjunctiva pink with no redness or exudates.   HENT: Normocephalic, atraumatic. Nares patent. No intraoral lesions.   Neck: Supple, without meningismus. Trachea at midline. No lymphadenopathy.  Pulmonary: Clear bilaterally with good chest wall excursion. No rales, rhonchi or wheezing. No accessory muscle use or stridor.  Cardiac: Regular rate and rhythm, no rubs, murmurs, or gallops.   Abdomen: Abdomen is soft, nontender, and nondistended.  No palpable organomegaly.  No rebound or guarding.  No CVA tenderness. Nonsurgical abdomen.  Genitourinary: Exam deferred.  Musculoskeletal: Full range of motion.  Pulses full and equal. No cyanosis, clubbing, or edema.  Neurological:  Cranial nerves are grossly intact, grossly normal sensation, no weakness, no focal findings identified.  Psychiatric: Appropriate mood and affect.                Patient History   Medical History[1]  Surgical History[2]  Family History[3]  Social History[4]    Physical Exam   ED Triage Vitals [06/17/25 1311]   Temperature Heart Rate " Respirations BP   36.6 °C (97.9 °F) 50 16 127/75      Pulse Ox Temp Source Heart Rate Source Patient Position   96 % Temporal -- --      BP Location FiO2 (%)     -- --       Physical Exam      ED Course & MDM   Diagnoses as of 06/17/25 1713   Vertigo   Bradycardia                 No data recorded                                 Medical Decision Making  Labs Reviewed  CBC WITH AUTO DIFFERENTIAL - Abnormal     WBC                           6.0                    nRBC                          0.0                    RBC                           3.97 (*)               Hemoglobin                    13.5                   Hematocrit                    39.1 (*)               MCV                           99                     MCH                           34.0                   MCHC                          34.5                   RDW                           13.4                   Platelets                     166                    Neutrophils %                 56.2                   Immature Granulocytes %, Automated   0.2                    Lymphocytes %                 32.9                   Monocytes %                   7.6                    Eosinophils %                 2.8                    Basophils %                   0.3                    Neutrophils Absolute          3.39                   Immature Granulocytes Absolute, Au*   0.01                   Lymphocytes Absolute          1.99                   Monocytes Absolute            0.46                   Eosinophils Absolute          0.17                   Basophils Absolute            0.02                COMPREHENSIVE METABOLIC PANEL - Abnormal     Glucose                       101 (*)                Sodium                        140                    Potassium                     4.1                    Chloride                      108 (*)                Bicarbonate                   24                     Anion Gap                     12                      Urea Nitrogen                 20                     Creatinine                    1.47 (*)               eGFR                          49 (*)                 Calcium                       9.1                    Albumin                       4.0                    Alkaline Phosphatase          47                     Total Protein                 6.8                    AST                           17                     Bilirubin, Total              0.8                    ALT                           10                  PROTIME-INR - Abnormal     Protime                       18.0 (*)               INR                           1.6 (*)             MAGNESIUM - Normal     Magnesium                     2.08                TROPONIN I, HIGH SENSITIVITY - Normal     Troponin I, High Sensitivity   4                          Narrative: Less than 99th percentile of normal range cutoff-                  Female and children under 18 years old <14 ng/L; Male <21 ng/L: Negative                  Repeat testing should be performed if clinically indicated.                                     Female and children under 18 years old 14-50 ng/L; Male 21-50 ng/L:                  Consistent with possible cardiac damage and possible increased clinical                   risk. Serial measurements may help to assess extent of myocardial damage.                                     >50 ng/L: Consistent with cardiac damage, increased clinical risk and                  myocardial infarction. Serial measurements may help assess extent of                   myocardial damage.                                      NOTE: Children less than 1 year old may have higher baseline troponin                   levels and results should be interpreted in conjunction with the overall                   clinical context.                                     NOTE: Troponin I testing is performed using a different                   testing methodology at  [facility] than at other                   [facility]. Direct result comparisons should only                   be made within the same method.  APTT - Normal     aPTT                          36                         Narrative: The APTT is no longer used for monitoring Unfractionated Heparin Therapy. For monitoring Heparin Therapy, use the Heparin Assay.  URINALYSIS WITH REFLEX CULTURE AND MICROSCOPIC - Normal     Color, Urine                                         Appearance, Urine             Clear                  Specific Gravity, Urine       1.012                  pH, Urine                     6.5                    Protein, Urine                NEGATIVE                Glucose, Urine                Normal                 Blood, Urine                  NEGATIVE                Ketones, Urine                NEGATIVE                Bilirubin, Urine              NEGATIVE                Urobilinogen, Urine           Normal                 Nitrite, Urine                NEGATIVE                Leukocyte Esterase, Urine     NEGATIVE             URINALYSIS WITH REFLEX CULTURE AND MICROSCOPIC         Narrative: The following orders were created for panel order Urinalysis with Reflex Culture and Microscopic.                  Procedure                               Abnormality         Status                                     ---------                               -----------         ------                                     Urinalysis with Reflex C...[[id number]]  Normal              Final result                               Extra Urine Gray Tube[[id number]]                            In process                                                   Please view results for these tests on the individual orders.  EXTRA URINE GRAY TUBE  CT head wo IV contrast   Final Result    No acute intracranial abnormality.                MACRO:    none          Signed by: Genet Brooks 6/17/2025 2:40 PM    Dictation  workstation:   [id number]     XR chest 1 view   Final Result    No acute cardiopulmonary process is evident.          MACRO:    None          Signed by: Fabio Elizabeth 6/17/2025 2:14 PM    Dictation workstation:   [id number]     Medical Decision Making:    Patient appears well and nontoxic.  No focal neurologic deficit.  NIH of 0.  EKG shows sinus bradycardia without acute AV block or ischemia.  Lab work unremarkable.  CT brain negative.  Chest x-ray clear.  Patient initially treated with 1 L normal saline and meclizine.  Improved but continues to have vertiginous symptoms.  Patient treated with oral Valium.  Case discussed with patient's cardiologist on-call, Dr. Sher, who recommended stopping patient's beta-blocker given his bradycardia but does not feel this is the cause of his vertigo.  Patient treated with intravenous Zofran and intramuscular glucagon.  Heart rate remaining between 46 and 52.  Patient has chronic bradycardia.  Patient will be given oral meclizine as well as Valium for home.  Advised on keeping pulse at home twice a day and sending results to cardiologist.  Asked return for new or worsening symptoms.  Stable at time of discharge.    Differential Diagnoses Considered: Intracranial hemorrhage, CVA, BPPV, electrolyte abnormality, volume depletion, symptomatic bradycardia    Independent Interpretation of Studies:  I independently interpreted: CT brain shows no intracranial hemorrhage or mass.  Chest x-ray without pneumonia or pneumothorax.    Escalation of Care:  Appropriate for discharge and follow-up with primary care and cardiology.    Prescription Drug Consideration: Oral meclizine and Valium.          Procedure  ECG 12 lead    Performed by: Arsen Durham DO  Authorized by: Arsen Durham DO    ECG interpreted by ED Physician in the absence of a cardiologist: yes    Comments:      EKG interpreted by Dr. John Durham: Sinus bradycardia 53 bpm.  MN interval 112 ms.  QTc 429  ms.         [1]   Past Medical History:  Diagnosis Date    A-fib (Multi)     Aortic aneurysm     Cervical spondylarthritis     Disease of thyroid gland     DVT (deep venous thrombosis) (Multi)     Hypertension     Lymphedema     Osteoarthritis     Plantar fasciitis, bilateral     RA (rheumatoid arthritis)    [2]   Past Surgical History:  Procedure Laterality Date    CARPAL TUNNEL RELEASE Bilateral     CATARACT EXTRACTION Right     CHOLECYSTECTOMY      COLONOSCOPY  2016    COLONOSCOPY  09/09/2024    Repeat 5 years  fam  hx of colon CA       ORCHIECTOMY      SQUAMOUS CELL CARCINOMA EXCISION      on scalp    TOTAL KNEE ARTHROPLASTY Bilateral    [3]   Family History  Problem Relation Name Age of Onset    Breast cancer Mother      Other (bowel obstruction) Mother      Colon cancer Father      Prostate cancer Paternal Grandfather      Cancer Other      Aortic aneurysm Other     [4]   Social History  Tobacco Use    Smoking status: Never    Smokeless tobacco: Never   Vaping Use    Vaping status: Never Used   Substance Use Topics    Alcohol use: Yes    Drug use: Never        Arsen Whitney DO  06/17/25 [id number]

## 2025-06-18 LAB — HOLD SPECIMEN: NORMAL

## 2025-06-21 LAB
ATRIAL RATE: 53 BPM
P OFFSET: 189 MS
P ONSET: 153 MS
PR INTERVAL: 112 MS
Q ONSET: 209 MS
QRS COUNT: 8 BEATS
QRS DURATION: 84 MS
QT INTERVAL: 458 MS
QTC CALCULATION(BAZETT): 429 MS
QTC FREDERICIA: 439 MS
R AXIS: -31 DEGREES
T AXIS: 11 DEGREES
T OFFSET: 438 MS
VENTRICULAR RATE: 53 BPM

## 2025-06-27 ENCOUNTER — ANTICOAGULATION - WARFARIN VISIT (OUTPATIENT)
Dept: PHARMACY | Facility: HOSPITAL | Age: 80
End: 2025-06-27
Payer: MEDICARE

## 2025-06-27 DIAGNOSIS — I48.19 PERSISTENT ATRIAL FIBRILLATION (MULTI): Primary | ICD-10-CM

## 2025-06-27 LAB
POC INR: 1.3 (ref 0.9–1.1)
POC PROTHROMBIN TIME: ABNORMAL (ref 9.3–12.5)

## 2025-06-27 PROCEDURE — 99211 OFF/OP EST MAY X REQ PHY/QHP: CPT | Performed by: PHARMACIST

## 2025-06-27 PROCEDURE — 85610 PROTHROMBIN TIME: CPT | Mod: QW

## 2025-06-27 NOTE — PROGRESS NOTES
Pt enrolled in [facility] for management of Persistent atrial fibrillation (Multi) [I48.19].     Pt current location in clinic.     Current anticoagulant: Warfarin    Time since last visit: new patient    Last INR: Pt is new to warfarin, started on 5 mg daily 4 days ago.      Last Creatinine:   Lab Results   Component Value Date    CREATININE 1.47 (H) 06/17/2025     Last hemoglobin/hematocrit:  Lab Results   Component Value Date    HGB 13.5 06/17/2025     Lab Results   Component Value Date    HCT 39.1 (L) 06/17/2025       Current INR: 1.3 is SUBtherapeutic for goal range of 2.0-3.0 and is reflective of 20 mg in the previous week prior to visit.    Dosing confirmed with patient  Patient denies any missed doses.  Patient denies any medication changes, diet changes, or OTC/herbal supplement changes since last visit.  Patient denies any adverse reactions or barriers.  Patient denies any CP/SOB, fatigue, bleeding or bruising since last visit.   Patient denies any change in alcohol or tobacco use since last visit.   Patient denies any upcoming medical or dental procedures.    Plan:  Patient was instructed to increase dose.  INR follow up will occur in 4 days.  Patient was instructed to maintain consistent vegetable intake, to monitor for any bruising or bleeding, and to call with any medication changes or concerns.    Pt handout given with above information    Dilma Schafer, PharmD  P:[phone]  F:[phone]

## 2025-06-30 ENCOUNTER — ANTICOAGULATION - WARFARIN VISIT (OUTPATIENT)
Dept: PHARMACY | Facility: HOSPITAL | Age: 80
End: 2025-06-30
Payer: MEDICARE

## 2025-06-30 DIAGNOSIS — I48.19 PERSISTENT ATRIAL FIBRILLATION (MULTI): Primary | ICD-10-CM

## 2025-06-30 LAB
POC INR: 2.1 (ref 0.9–1.1)
POC PROTHROMBIN TIME: ABNORMAL (ref 9.3–12.5)

## 2025-06-30 PROCEDURE — 85610 PROTHROMBIN TIME: CPT | Mod: QW | Performed by: NURSE PRACTITIONER

## 2025-06-30 PROCEDURE — 99211 OFF/OP EST MAY X REQ PHY/QHP: CPT | Performed by: PHARMACIST

## 2025-06-30 NOTE — PROGRESS NOTES
Pt enrolled in [facility] for management of Persistent atrial fibrillation (Multi) [I48.19].     Pt current location in clinic.     Current anticoagulant: Warfarin    Time since last visit: 3 days    Last INR: 1.3 on warfarin 20 mg in the previous week. Pt was instructed to take 10 mg daily over the weekend at last visit.    Last Creatinine:   Lab Results   Component Value Date    CREATININE 1.47 (H) 06/17/2025     Last hemoglobin/hematocrit:  Lab Results   Component Value Date    HGB 13.5 06/17/2025     Lab Results   Component Value Date    HCT 39.1 (L) 06/17/2025       Current INR: 2.1 is therapeutic for goal range of 2.0-3.0 and is reflective of 50 mg in the previous week prior to visit with higher doses over the weekend to get him started on warfairn    Dosing confirmed with patient  Patient denies any missed doses.  Patient denies any medication changes, diet changes, or OTC/herbal supplement changes since last visit.  Patient denies any adverse reactions or barriers.  Patient denies any CP/SOB, fatigue, bleeding or bruising since last visit.   Patient denies any change in alcohol or tobacco use since last visit.   Patient denies any upcoming medical or dental procedures.    Plan:  Patient was instructed toresume 5mg daily.  INR follow up will occur in 1 weeks.  Patient was instructed to maintain consistent vegetable intake, to monitor for any bruising or bleeding, and to call with any medication changes or concerns.    Pt handout given with above information    Gatito Jauregui, PharmD  P:[phone]  F:[phone]

## 2025-07-07 ENCOUNTER — APPOINTMENT (OUTPATIENT)
Dept: PHARMACY | Facility: HOSPITAL | Age: 80
End: 2025-07-07
Payer: MEDICARE

## 2025-07-07 DIAGNOSIS — I48.19 PERSISTENT ATRIAL FIBRILLATION (MULTI): Primary | ICD-10-CM

## 2025-07-07 LAB
POC INR: 3.9 (ref 0.9–1.1)
POC PROTHROMBIN TIME: ABNORMAL (ref 9.3–12.5)

## 2025-07-07 PROCEDURE — 99211 OFF/OP EST MAY X REQ PHY/QHP: CPT | Performed by: PHARMACIST

## 2025-07-07 PROCEDURE — 85610 PROTHROMBIN TIME: CPT | Mod: QW | Performed by: NURSE PRACTITIONER

## 2025-07-07 NOTE — PROGRESS NOTES
Pt enrolled in [facility] for management of Persistent atrial fibrillation (Multi) [I48.19].     Pt current location in clinic.     Current anticoagulant: Warfarin    Time since last visit: 1 weeks    Last INR: 2.1 on warfarin 50 mg in the previous week. Pt was instructed to resume 35mg weekly at last visit.    Last Creatinine:   Lab Results   Component Value Date    CREATININE 1.47 (H) 06/17/2025     Last hemoglobin/hematocrit:  Lab Results   Component Value Date    HGB 13.5 06/17/2025     Lab Results   Component Value Date    HCT 39.1 (L) 06/17/2025       Current INR: 3.9 is SUPRAtherapeutic for goal range of 2.0-3.0 and is reflective of 35 mg in the previous week prior to visit.    Dosing confirmed with patient  Patient denies any missed doses.  Patient denies any medication changes, diet changes, or OTC/herbal supplement changes since last visit.  Patient denies any adverse reactions or barriers.  Patient denies any CP/SOB, fatigue, bleeding or bruising since last visit.   Patient denies any change in alcohol or tobacco use since last visit.   Patient denies any upcoming medical or dental procedures.    Plan:  Patient was instructed to hold x1, then start 30 mg weekly.  INR follow up will occur in 1 weeks.  Patient was instructed to maintain consistent vegetable intake, to monitor for any bruising or bleeding, and to call with any medication changes or concerns.    Pt handout given with above information    Gatito Jauregui, PharmD  P:[phone]  F:[phone]

## 2025-07-14 ENCOUNTER — APPOINTMENT (OUTPATIENT)
Dept: PHARMACY | Facility: HOSPITAL | Age: 80
End: 2025-07-14
Payer: MEDICARE

## 2025-07-14 DIAGNOSIS — I48.19 PERSISTENT ATRIAL FIBRILLATION (MULTI): Primary | ICD-10-CM

## 2025-07-14 LAB
POC INR: 2.1 (ref 0.9–1.1)
POC PROTHROMBIN TIME: ABNORMAL (ref 9.3–12.5)

## 2025-07-14 PROCEDURE — 99211 OFF/OP EST MAY X REQ PHY/QHP: CPT

## 2025-07-14 PROCEDURE — 85610 PROTHROMBIN TIME: CPT | Mod: QW

## 2025-07-14 NOTE — PROGRESS NOTES
Pt enrolled in [facility] for management of Persistent atrial fibrillation (Multi) [I48.19].     Pt current location in clinic.     Current anticoagulant: Warfarin    Time since last visit: 1 weeks    Last INR: 3.9 on warfarin 40 mg in the previous week. Pt was instructed to  hold x1, then start 30 mg weekly at last visit.    Last Creatinine:   Lab Results   Component Value Date    CREATININE 1.47 (H) 06/17/2025     Last hemoglobin/hematocrit:  Lab Results   Component Value Date    HGB 13.5 06/17/2025     Lab Results   Component Value Date    HCT 39.1 (L) 06/17/2025       Current INR: 2.1 is therapeutic for goal range of 2.0-3.0 and is reflective of 25 mg in the previous week prior to visit.    Dosing confirmed with patient  Patient denies any missed doses.  Patient denies any medication changes, diet changes, or OTC/herbal supplement changes since last visit.  Patient denies any adverse reactions or barriers.  Patient denies any CP/SOB, fatigue, bleeding or bruising since last visit.   Patient denies any change in alcohol or tobacco use since last visit.   Patient denies any upcoming medical or dental procedures.    Patient states he is going to be out of state this weekend.     Plan:  Patient was instructed to continue with current warfarin dose of 2.5 mg every Wed, Sat; 5 mg all other days .  INR follow up will occur in 1 weeks.  Patient was instructed to maintain consistent vegetable intake, to monitor for any bruising or bleeding, and to call with any medication changes or concerns.    Pt handout given with above information    Jeison Burrows, LuisD  P:[phone]  F:[phone]

## 2025-07-23 ENCOUNTER — ANTICOAGULATION - WARFARIN VISIT (OUTPATIENT)
Dept: PHARMACY | Facility: HOSPITAL | Age: 80
End: 2025-07-23
Payer: MEDICARE

## 2025-07-23 DIAGNOSIS — I48.19 PERSISTENT ATRIAL FIBRILLATION (MULTI): Primary | ICD-10-CM

## 2025-07-23 LAB
POC INR: 2.2 (ref 0.9–1.1)
POC PROTHROMBIN TIME: ABNORMAL (ref 9.3–12.5)

## 2025-07-23 PROCEDURE — 85610 PROTHROMBIN TIME: CPT | Mod: QW | Performed by: NURSE PRACTITIONER

## 2025-07-23 PROCEDURE — 99211 OFF/OP EST MAY X REQ PHY/QHP: CPT | Performed by: PHARMACIST

## 2025-07-23 NOTE — PROGRESS NOTES
Pt enrolled in [facility] for management of Persistent atrial fibrillation (Multi) [I48.19].     Pt current location in clinic.     Current anticoagulant: Warfarin    Time since last visit: 1 weeks    Last INR: 2.1 on warfarin 30 mg in the previous week. No changes were made at that time.    Last Creatinine:   Lab Results   Component Value Date    CREATININE 1.47 (H) 06/17/2025     Last hemoglobin/hematocrit:  Lab Results   Component Value Date    HGB 13.5 06/17/2025     Lab Results   Component Value Date    HCT 39.1 (L) 06/17/2025       Current INR: 2.2 is therapeutic for goal range of 2.0-3.0 and is reflective of 30 mg in the previous week prior to visit.    Dosing confirmed with patient  Patient denies any missed doses.  Patient denies any medication changes, diet changes, or OTC/herbal supplement changes since last visit.  Patient denies any adverse reactions or barriers.  Patient denies any CP/SOB, fatigue, bleeding or bruising since last visit.   Patient denies any change in alcohol or tobacco use since last visit.   Patient denies any upcoming medical or dental procedures.    Plan:  Patient was instructed to continue with current warfarin dose.  INR follow up will occur in 2 weeks.  Patient was instructed to maintain consistent vegetable intake, to monitor for any bruising or bleeding, and to call with any medication changes or concerns.    Pt handout given with above information    Gatito Jauregui, PharmD  P:[phone]  F:[phone]

## 2025-07-31 ENCOUNTER — APPOINTMENT (OUTPATIENT)
Dept: PRIMARY CARE | Facility: CLINIC | Age: 80
End: 2025-07-31
Payer: MEDICARE

## 2025-08-06 ENCOUNTER — APPOINTMENT (OUTPATIENT)
Dept: PHARMACY | Facility: HOSPITAL | Age: 80
End: 2025-08-06
Payer: MEDICARE

## 2025-08-06 DIAGNOSIS — I48.19 PERSISTENT ATRIAL FIBRILLATION (MULTI): Primary | ICD-10-CM

## 2025-08-06 LAB
POC INR: 1.1 (ref 0.9–1.1)
POC PROTHROMBIN TIME: NORMAL (ref 9.3–12.5)

## 2025-08-06 PROCEDURE — 85610 PROTHROMBIN TIME: CPT | Mod: QW | Performed by: NURSE PRACTITIONER

## 2025-08-06 PROCEDURE — 99211 OFF/OP EST MAY X REQ PHY/QHP: CPT

## 2025-08-06 NOTE — PROGRESS NOTES
Pt enrolled in [facility] for management of Persistent atrial fibrillation (Multi) [I48.19].     Pt current location in clinic.     Current anticoagulant: Warfarin    Time since last visit: 2 weeks    Last INR: 2.2 on warfarin 30 mg in the previous week. No changes were made at that time.    Last Creatinine:   Lab Results   Component Value Date    CREATININE 1.47 (H) 06/17/2025     Last hemoglobin/hematocrit:  Lab Results   Component Value Date    HGB 13.5 06/17/2025     Lab Results   Component Value Date    HCT 39.1 (L) 06/17/2025       Current INR: 1.1 is SUBtherapeutic for goal range of 2.0-3.0 and is reflective of 30 mg in the previous week prior to visit.    Dosing confirmed with patient  Patient denies any missed doses.  Patient denies any medication changes, diet changes, or OTC/herbal supplement changes since last visit.  Patient denies any adverse reactions or barriers.  Patient denies any CP/SOB, fatigue, bleeding or bruising since last visit.   Patient denies any change in alcohol or tobacco use since last visit.   Patient denies any upcoming medical or dental procedures.    Plan:  Patient was instructed to take 10mg today, 7.5mg tomororwn, then start 35mg weekly again.  INR follow up will occur in 1 weeks.   Patient was instructed to maintain consistent vegetable intake, to monitor for any bruising or bleeding, and to call with any medication changes or concerns.    Pt handout given with above information    Dariel Robledo, LuisD  P:[phone]  F:[phone]

## 2025-08-13 ENCOUNTER — ANTICOAGULATION - WARFARIN VISIT (OUTPATIENT)
Dept: PHARMACY | Facility: HOSPITAL | Age: 80
End: 2025-08-13
Payer: MEDICARE

## 2025-08-13 DIAGNOSIS — I48.19 PERSISTENT ATRIAL FIBRILLATION (MULTI): Primary | ICD-10-CM

## 2025-08-13 LAB
POC INR: 1.8 (ref 2–3)
POC PROTHROMBIN TIME: ABNORMAL (ref 9.3–12.5)

## 2025-08-13 PROCEDURE — 99211 OFF/OP EST MAY X REQ PHY/QHP: CPT | Performed by: PHARMACIST

## 2025-08-13 PROCEDURE — 85610 PROTHROMBIN TIME: CPT | Mod: QW | Performed by: NURSE PRACTITIONER

## 2025-08-15 ENCOUNTER — APPOINTMENT (OUTPATIENT)
Dept: PHARMACY | Facility: HOSPITAL | Age: 80
End: 2025-08-15
Payer: MEDICARE

## 2025-08-22 ENCOUNTER — ANTICOAGULATION - WARFARIN VISIT (OUTPATIENT)
Dept: PHARMACY | Facility: HOSPITAL | Age: 80
End: 2025-08-22
Payer: MEDICARE

## 2025-08-22 DIAGNOSIS — I48.19 PERSISTENT ATRIAL FIBRILLATION (MULTI): Primary | ICD-10-CM

## 2025-08-22 LAB
POC INR: 2.5 (ref 0.9–1.1)
POC PROTHROMBIN TIME: ABNORMAL (ref 9.3–12.5)

## 2025-08-22 PROCEDURE — 99211 OFF/OP EST MAY X REQ PHY/QHP: CPT | Performed by: PHARMACIST

## 2025-08-22 PROCEDURE — 85610 PROTHROMBIN TIME: CPT | Mod: QW | Performed by: NURSE PRACTITIONER

## 2025-08-28 ENCOUNTER — APPOINTMENT (OUTPATIENT)
Dept: PRIMARY CARE | Facility: CLINIC | Age: 80
End: 2025-08-28
Payer: MEDICARE

## 2025-08-28 VITALS
HEIGHT: 75 IN | SYSTOLIC BLOOD PRESSURE: 114 MMHG | BODY MASS INDEX: 34.32 KG/M2 | HEART RATE: 60 BPM | DIASTOLIC BLOOD PRESSURE: 78 MMHG | WEIGHT: 276 LBS

## 2025-08-28 DIAGNOSIS — E78.00 HIGH CHOLESTEROL: ICD-10-CM

## 2025-08-28 DIAGNOSIS — I71.20 THORACIC AORTIC ANEURYSM WITHOUT RUPTURE, UNSPECIFIED PART: ICD-10-CM

## 2025-08-28 DIAGNOSIS — Z00.00 ROUTINE GENERAL MEDICAL EXAMINATION AT HEALTH CARE FACILITY: Primary | ICD-10-CM

## 2025-08-28 DIAGNOSIS — N18.31 CHRONIC KIDNEY DISEASE, STAGE 3A (MULTI): ICD-10-CM

## 2025-08-28 DIAGNOSIS — E03.9 ACQUIRED HYPOTHYROIDISM: ICD-10-CM

## 2025-08-28 DIAGNOSIS — I48.0 PAROXYSMAL ATRIAL FIBRILLATION (MULTI): ICD-10-CM

## 2025-08-28 PROCEDURE — 3078F DIAST BP <80 MM HG: CPT | Performed by: FAMILY MEDICINE

## 2025-08-28 PROCEDURE — 3074F SYST BP LT 130 MM HG: CPT | Performed by: FAMILY MEDICINE

## 2025-08-28 PROCEDURE — 1159F MED LIST DOCD IN RCRD: CPT | Performed by: FAMILY MEDICINE

## 2025-08-28 PROCEDURE — 1036F TOBACCO NON-USER: CPT | Performed by: FAMILY MEDICINE

## 2025-08-28 PROCEDURE — 99214 OFFICE O/P EST MOD 30 MIN: CPT | Performed by: FAMILY MEDICINE

## 2025-08-28 PROCEDURE — 1160F RVW MEDS BY RX/DR IN RCRD: CPT | Performed by: FAMILY MEDICINE

## 2025-08-28 PROCEDURE — G0439 PPPS, SUBSEQ VISIT: HCPCS | Performed by: FAMILY MEDICINE

## 2025-08-28 PROCEDURE — 1170F FXNL STATUS ASSESSED: CPT | Performed by: FAMILY MEDICINE

## 2025-08-28 PROCEDURE — 1123F ACP DISCUSS/DSCN MKR DOCD: CPT | Performed by: FAMILY MEDICINE

## 2025-08-28 ASSESSMENT — ACTIVITIES OF DAILY LIVING (ADL)
TAKING_MEDICATION: INDEPENDENT
DRESSING: INDEPENDENT
BATHING: INDEPENDENT
MANAGING_FINANCES: INDEPENDENT
GROCERY_SHOPPING: INDEPENDENT
DOING_HOUSEWORK: INDEPENDENT

## 2025-08-28 ASSESSMENT — ENCOUNTER SYMPTOMS
SHORTNESS OF BREATH: 0
DYSURIA: 0
HEMATURIA: 0
BLOOD IN STOOL: 0
POLYDIPSIA: 0
PALPITATIONS: 0
CONSTIPATION: 1
DIFFICULTY URINATING: 1
COUGH: 0
POLYPHAGIA: 0

## 2025-08-28 ASSESSMENT — PATIENT HEALTH QUESTIONNAIRE - PHQ9
2. FEELING DOWN, DEPRESSED OR HOPELESS: NOT AT ALL
SUM OF ALL RESPONSES TO PHQ9 QUESTIONS 1 AND 2: 0
1. LITTLE INTEREST OR PLEASURE IN DOING THINGS: NOT AT ALL

## 2025-08-30 LAB
ALBUMIN SERPL-MCNC: 4.3 G/DL (ref 3.6–5.1)
ALP SERPL-CCNC: 52 U/L (ref 35–144)
ALT SERPL-CCNC: 15 U/L (ref 9–46)
ANION GAP SERPL CALCULATED.4IONS-SCNC: 8 MMOL/L (CALC) (ref 7–17)
AST SERPL-CCNC: 18 U/L (ref 10–35)
BILIRUB SERPL-MCNC: 0.7 MG/DL (ref 0.2–1.2)
BUN SERPL-MCNC: 21 MG/DL (ref 7–25)
CALCIUM SERPL-MCNC: 9.2 MG/DL (ref 8.6–10.3)
CHLORIDE SERPL-SCNC: 107 MMOL/L (ref 98–110)
CHOLEST SERPL-MCNC: 141 MG/DL
CHOLEST/HDLC SERPL: 2.3 (CALC)
CO2 SERPL-SCNC: 27 MMOL/L (ref 20–32)
CREAT SERPL-MCNC: 1.38 MG/DL (ref 0.7–1.28)
EGFRCR SERPLBLD CKD-EPI 2021: 52 ML/MIN/1.73M2
ERYTHROCYTE [DISTWIDTH] IN BLOOD BY AUTOMATED COUNT: 13.5 % (ref 11–15)
GLUCOSE SERPL-MCNC: 100 MG/DL (ref 65–99)
HCT VFR BLD AUTO: 43.2 % (ref 38.5–50)
HDLC SERPL-MCNC: 62 MG/DL
HGB BLD-MCNC: 14.3 G/DL (ref 13.2–17.1)
LDLC SERPL CALC-MCNC: 65 MG/DL (CALC)
MCH RBC QN AUTO: 33.6 PG (ref 27–33)
MCHC RBC AUTO-ENTMCNC: 33.1 G/DL (ref 32–36)
MCV RBC AUTO: 101.6 FL (ref 80–100)
NONHDLC SERPL-MCNC: 79 MG/DL (CALC)
PLATELET # BLD AUTO: 159 THOUSAND/UL (ref 140–400)
PMV BLD REES-ECKER: 11.5 FL (ref 7.5–12.5)
POTASSIUM SERPL-SCNC: 5.3 MMOL/L (ref 3.5–5.3)
PROT SERPL-MCNC: 6.8 G/DL (ref 6.1–8.1)
RBC # BLD AUTO: 4.25 MILLION/UL (ref 4.2–5.8)
SODIUM SERPL-SCNC: 142 MMOL/L (ref 135–146)
T4 FREE SERPL-MCNC: 1.3 NG/DL (ref 0.8–1.8)
TRIGL SERPL-MCNC: 61 MG/DL
TSH SERPL-ACNC: 6.37 MIU/L (ref 0.4–4.5)
WBC # BLD AUTO: 6.3 THOUSAND/UL (ref 3.8–10.8)

## 2025-09-05 ENCOUNTER — ANTICOAGULATION - WARFARIN VISIT (OUTPATIENT)
Dept: PHARMACY | Facility: HOSPITAL | Age: 80
End: 2025-09-05
Payer: MEDICARE

## 2025-09-05 DIAGNOSIS — I48.19 PERSISTENT ATRIAL FIBRILLATION (MULTI): Primary | ICD-10-CM

## 2025-09-05 LAB
POC INR: 1.8 (ref 0.9–1.1)
POC PROTHROMBIN TIME: ABNORMAL (ref 9.3–12.5)

## 2025-09-05 PROCEDURE — 85610 PROTHROMBIN TIME: CPT | Mod: QW | Performed by: NURSE PRACTITIONER

## 2025-09-05 PROCEDURE — 99211 OFF/OP EST MAY X REQ PHY/QHP: CPT | Performed by: PHARMACIST

## 2025-10-13 ENCOUNTER — APPOINTMENT (OUTPATIENT)
Dept: CARDIOLOGY | Facility: CLINIC | Age: 80
End: 2025-10-13
Payer: MEDICARE

## 2026-03-02 ENCOUNTER — APPOINTMENT (OUTPATIENT)
Dept: PRIMARY CARE | Facility: CLINIC | Age: 81
End: 2026-03-02
Payer: MEDICARE